# Patient Record
Sex: FEMALE | Race: WHITE | NOT HISPANIC OR LATINO | Employment: UNEMPLOYED | URBAN - NONMETROPOLITAN AREA
[De-identification: names, ages, dates, MRNs, and addresses within clinical notes are randomized per-mention and may not be internally consistent; named-entity substitution may affect disease eponyms.]

---

## 2017-03-24 PROCEDURE — 87086 URINE CULTURE/COLONY COUNT: CPT | Performed by: NURSE PRACTITIONER

## 2018-08-14 ENCOUNTER — OFFICE VISIT (OUTPATIENT)
Dept: OBSTETRICS AND GYNECOLOGY | Facility: CLINIC | Age: 23
End: 2018-08-14

## 2018-08-14 VITALS
HEART RATE: 104 BPM | SYSTOLIC BLOOD PRESSURE: 122 MMHG | BODY MASS INDEX: 29.49 KG/M2 | DIASTOLIC BLOOD PRESSURE: 86 MMHG | HEIGHT: 65 IN | WEIGHT: 177 LBS

## 2018-08-14 DIAGNOSIS — Z30.011 ENCOUNTER FOR INITIAL PRESCRIPTION OF CONTRACEPTIVE PILLS: ICD-10-CM

## 2018-08-14 DIAGNOSIS — Z01.419 ENCOUNTER FOR GYNECOLOGICAL EXAMINATION WITHOUT ABNORMAL FINDING: Primary | ICD-10-CM

## 2018-08-14 PROCEDURE — 99385 PREV VISIT NEW AGE 18-39: CPT | Performed by: NURSE PRACTITIONER

## 2018-08-14 PROCEDURE — G0123 SCREEN CERV/VAG THIN LAYER: HCPCS | Performed by: NURSE PRACTITIONER

## 2018-08-14 RX ORDER — NORETHINDRONE ACETATE AND ETHINYL ESTRADIOL AND FERROUS FUMARATE 1MG-20(24)
1 KIT ORAL DAILY
Qty: 28 TABLET | Refills: 3 | Status: SHIPPED | OUTPATIENT
Start: 2018-08-14 | End: 2019-08-14

## 2018-08-14 NOTE — PROGRESS NOTES
Subjective   Sandra Botello is a 23 y.o. Here for pap smear and would like to start on a birth control pill.     LMP- ; monthly for 5 days. Normal flow and very little cramping  Last pap- never  STI screening- never      Gynecologic Exam   The patient's pertinent negatives include no genital itching, genital lesions, genital odor, genital rash, missed menses, pelvic pain, vaginal bleeding or vaginal discharge. Pertinent negatives include no abdominal pain, constipation, diarrhea, dysuria, headaches, nausea, rash, urgency or vomiting. She is not sexually active. She uses abstinence for contraception. Her menstrual history has been regular. There is no history of an abdominal surgery, a  section, an ectopic pregnancy, endometriosis, a gynecological surgery, herpes simplex, menorrhagia, metrorrhagia, miscarriage, ovarian cysts, perineal abscess, PID, an STD, a terminated pregnancy or vaginosis.       The following portions of the patient's history were reviewed and updated as appropriate: allergies, current medications, past family history, past medical history, past social history, past surgical history and problem list.    Review of Systems   Constitutional: Negative for activity change, appetite change, fatigue and unexpected weight change.   Respiratory: Negative for chest tightness and shortness of breath.    Cardiovascular: Negative for chest pain, palpitations and leg swelling.   Gastrointestinal: Negative for abdominal distention, abdominal pain, blood in stool, constipation, diarrhea, nausea and vomiting.   Endocrine: Negative for cold intolerance, heat intolerance, polydipsia, polyphagia and polyuria.   Genitourinary: Negative for difficulty urinating, dysuria, genital sores, menorrhagia, menstrual problem, missed menses, pelvic pain, urgency, vaginal bleeding, vaginal discharge and vaginal pain.   Musculoskeletal: Negative for gait problem and myalgias.   Skin: Negative for color change,  pallor and rash.   Neurological: Negative for dizziness, weakness, light-headedness and headaches.   Hematological: Negative for adenopathy.   Psychiatric/Behavioral: Negative for agitation, confusion, dysphoric mood, self-injury and suicidal ideas. The patient is not nervous/anxious.        Objective   Physical Exam   Constitutional: She is oriented to person, place, and time. She appears well-developed and well-nourished.   Neck: No thyromegaly present.   Cardiovascular: Normal rate, regular rhythm, normal heart sounds and intact distal pulses.    Pulmonary/Chest: Effort normal and breath sounds normal. Right breast exhibits no inverted nipple, no mass, no nipple discharge, no skin change and no tenderness. Left breast exhibits no inverted nipple, no mass, no nipple discharge, no skin change and no tenderness. Breasts are symmetrical.   Abdominal: Soft. Bowel sounds are normal. She exhibits no distension. There is no tenderness.   Genitourinary: No breast discharge or bleeding. There is no rash, tenderness, lesion or injury on the right labia. There is no rash, tenderness, lesion or injury on the left labia. Right adnexum displays no mass, no tenderness and no fullness. Left adnexum displays no mass, no tenderness and no fullness. There is erythema in the vagina. No tenderness or bleeding in the vagina. No foreign body in the vagina. No signs of injury around the vagina. Vaginal discharge found.   Genitourinary Comments: Exam limited 2/2 pain. Blind sweep pap smear obtained.    Lymphadenopathy:     She has no axillary adenopathy.        Right: No inguinal adenopathy present.        Left: No inguinal adenopathy present.   Neurological: She is alert and oriented to person, place, and time.   Skin: Skin is warm, dry and intact.   Psychiatric: She has a normal mood and affect. Her speech is normal and behavior is normal.   Nursing note and vitals reviewed.        Assessment/Plan   Sandra was seen today for gynecologic  exam.    Diagnoses and all orders for this visit:    Encounter for gynecological examination without abnormal finding  -     Liquid-based Pap Smear, Screening    Encounter for initial prescription of contraceptive pills    Other orders  -     norethindrone-ethinyl estradiol-ferrous fumarate (LOESTIN 24 FE) 1-20 MG-MCG(24) per tablet; Take 1 tablet by mouth Daily.        R/B/A and administration reviewed. F/U in 3 months for BP recheck.

## 2018-08-16 LAB
LAB AP CASE REPORT: NORMAL
LAB AP GYN ADDITIONAL INFORMATION: NORMAL
LAB AP GYN OTHER FINDINGS: NORMAL
PATH INTERP SPEC-IMP: NORMAL
STAT OF ADQ CVX/VAG CYTO-IMP: NORMAL

## 2019-10-21 ENCOUNTER — OFFICE VISIT (OUTPATIENT)
Dept: OBSTETRICS AND GYNECOLOGY | Facility: CLINIC | Age: 24
End: 2019-10-21

## 2019-10-21 VITALS
BODY MASS INDEX: 29.82 KG/M2 | SYSTOLIC BLOOD PRESSURE: 102 MMHG | WEIGHT: 179 LBS | HEIGHT: 65 IN | DIASTOLIC BLOOD PRESSURE: 64 MMHG

## 2019-10-21 DIAGNOSIS — Z30.011 ENCOUNTER FOR INITIAL PRESCRIPTION OF CONTRACEPTIVE PILLS: Primary | ICD-10-CM

## 2019-10-21 PROCEDURE — 99213 OFFICE O/P EST LOW 20 MIN: CPT | Performed by: NURSE PRACTITIONER

## 2019-10-21 RX ORDER — NORETHINDRONE ACETATE AND ETHINYL ESTRADIOL AND FERROUS FUMARATE 1MG-20(24)
1 KIT ORAL DAILY
Qty: 28 TABLET | Refills: 12 | Status: SHIPPED | OUTPATIENT
Start: 2019-10-21 | End: 2020-10-06 | Stop reason: SDUPTHER

## 2019-10-21 NOTE — PROGRESS NOTES
Subjective   Sandra Botello is a 24 y.o. here for birth control     LMP 10/11/2019  BC: None  STI screening-Never.     Pt desires to start birth control pills; has not been sexually active in the past. Previously took Loestin for one month, but stopped taking it as she was not sexually active.       Contraception   Pertinent negatives include no abdominal pain, anorexia, chest pain, chills, coughing, fatigue, fever, headaches, nausea, rash, vomiting or weakness.       The following portions of the patient's history were reviewed and updated as appropriate: allergies, current medications, past family history, past medical history, past social history, past surgical history and problem list.    Review of Systems   Constitutional: Negative for chills, fatigue, fever, unexpected weight gain and unexpected weight loss.   Respiratory: Negative for cough and shortness of breath.    Cardiovascular: Negative for chest pain, palpitations and leg swelling.   Gastrointestinal: Negative for abdominal pain, anorexia, constipation, diarrhea, nausea and vomiting.   Endocrine: Negative for cold intolerance and heat intolerance.   Genitourinary: Negative for amenorrhea, difficulty urinating, dysuria, frequency, menstrual problem, urinary incontinence, vaginal bleeding, vaginal discharge and vaginal pain.   Skin: Negative for rash.   Neurological: Negative for weakness and headache.   Psychiatric/Behavioral: Negative for sleep disturbance, depressed mood and stress.       Objective   Physical Exam   Constitutional: She is oriented to person, place, and time. She appears well-developed and well-nourished.   HENT:   Head: Normocephalic.   Neck: Normal range of motion. Neck supple.   Cardiovascular: Normal rate and regular rhythm.   Pulmonary/Chest: Effort normal and breath sounds normal.   Abdominal: Soft.   Musculoskeletal: Normal range of motion.   Neurological: She is alert and oriented to person, place, and time.   Skin: Skin  is warm and dry.   Psychiatric: She has a normal mood and affect. Her behavior is normal.   Nursing note and vitals reviewed.        Assessment/Plan   Sandra was seen today for contraception.    Diagnoses and all orders for this visit:    Encounter for initial prescription of contraceptive pills    Other orders  -     norethindrone-ethinyl estradiol-ferrous fumarate (LOESTIN 24 FE) 1-20 MG-MCG(24) per tablet; Take 1 tablet by mouth Daily.      Pt desires OCPs; RBA discussed. Pt declined to discuss other methods. Reviewed mechanism of OCPs to prevent pregnancy. Reiterated OCPs do not protect from STIs; use condoms. Instructed pt can begin OCPs starting today; use back up method or abstain for 7 days before relying it for birth control. Return in 3 months for follow up and B/P check.

## 2020-03-10 ENCOUNTER — OFFICE VISIT (OUTPATIENT)
Dept: OBSTETRICS AND GYNECOLOGY | Facility: CLINIC | Age: 25
End: 2020-03-10

## 2020-03-10 VITALS
SYSTOLIC BLOOD PRESSURE: 106 MMHG | HEIGHT: 65 IN | BODY MASS INDEX: 29.49 KG/M2 | DIASTOLIC BLOOD PRESSURE: 64 MMHG | WEIGHT: 177 LBS

## 2020-03-10 DIAGNOSIS — Z30.41 ENCOUNTER FOR SURVEILLANCE OF CONTRACEPTIVE PILLS: Primary | ICD-10-CM

## 2020-03-10 PROCEDURE — 99212 OFFICE O/P EST SF 10 MIN: CPT | Performed by: NURSE PRACTITIONER

## 2020-03-10 NOTE — PROGRESS NOTES
Subjective   Sandra Botello is a 25 y.o. here for birth control follow-up  LMP: last week  BC: Loestrin  PAP:  August 2018      Pt reports she is happy with her birth control. She did have some breakthrough bleeding last month when she was late on taking her pills.  She is now sexually active and reports use of condoms. She believed that needed a PAP today but counseled pt that her PAP was normal in 2018 and she is not due till August of 2021. Blood pressure today is 106/64.     Contraception   This is a new problem. The current episode started more than 1 month ago. The problem occurs constantly. Pertinent negatives include no abdominal pain, fatigue, nausea, vomiting or weakness. Nothing aggravates the symptoms.       The following portions of the patient's history were reviewed and updated as appropriate: allergies, current medications, past family history, past medical history, past social history, past surgical history and problem list.    Review of Systems   Constitutional: Negative for fatigue.   Gastrointestinal: Negative for abdominal pain, nausea and vomiting.   Neurological: Negative for weakness.       Objective   Physical Exam   Constitutional: She is oriented to person, place, and time. She appears well-developed and well-nourished.   HENT:   Head: Normocephalic.   Neck: Normal range of motion.   Pulmonary/Chest: Effort normal.   Abdominal: Soft.   Neurological: She is alert and oriented to person, place, and time.   Skin: Skin is warm and dry.   Psychiatric: She has a normal mood and affect. Her behavior is normal.   Nursing note and vitals reviewed.        Assessment/Plan   Sandra was seen today for follow-up.    Diagnoses and all orders for this visit:    Encounter for surveillance of contraceptive pills        Reviewed cytology guidelines. Return in October for BC refills and Pap is due August 2021.

## 2020-09-30 PROBLEM — N39.0 URINARY TRACT INFECTION IN FEMALE: Status: ACTIVE | Noted: 2020-09-30

## 2020-10-06 ENCOUNTER — OFFICE VISIT (OUTPATIENT)
Dept: OBSTETRICS AND GYNECOLOGY | Facility: CLINIC | Age: 25
End: 2020-10-06

## 2020-10-06 ENCOUNTER — LAB (OUTPATIENT)
Dept: LAB | Facility: HOSPITAL | Age: 25
End: 2020-10-06

## 2020-10-06 VITALS
SYSTOLIC BLOOD PRESSURE: 102 MMHG | DIASTOLIC BLOOD PRESSURE: 64 MMHG | WEIGHT: 166 LBS | BODY MASS INDEX: 27.66 KG/M2 | HEIGHT: 65 IN

## 2020-10-06 DIAGNOSIS — N39.0 URINARY TRACT INFECTION IN FEMALE: Primary | ICD-10-CM

## 2020-10-06 DIAGNOSIS — Z11.3 SCREENING FOR STDS (SEXUALLY TRANSMITTED DISEASES): Primary | ICD-10-CM

## 2020-10-06 PROCEDURE — 87591 N.GONORRHOEAE DNA AMP PROB: CPT | Performed by: NURSE PRACTITIONER

## 2020-10-06 PROCEDURE — 99213 OFFICE O/P EST LOW 20 MIN: CPT | Performed by: NURSE PRACTITIONER

## 2020-10-06 PROCEDURE — 87661 TRICHOMONAS VAGINALIS AMPLIF: CPT | Performed by: NURSE PRACTITIONER

## 2020-10-06 PROCEDURE — 87491 CHLMYD TRACH DNA AMP PROBE: CPT | Performed by: NURSE PRACTITIONER

## 2020-10-06 RX ORDER — NORETHINDRONE ACETATE AND ETHINYL ESTRADIOL AND FERROUS FUMARATE 1MG-20(24)
1 KIT ORAL DAILY
Qty: 84 TABLET | Refills: 3 | Status: SHIPPED | OUTPATIENT
Start: 2020-10-06 | End: 2021-02-12

## 2020-10-06 NOTE — PROGRESS NOTES
Subjective   Sandra Botello is a 25 y.o. here for follow-up on birth control pills    Sandra is happy with her birth control pills. Has never been screening for G/C. Uses condoms and has one sexual partner. Pt performs self breast exams and has no other gyn concerns.       The following portions of the patient's history were reviewed and updated as appropriate: allergies, current medications, past family history, past medical history, past social history, past surgical history and problem list.    Review of Systems   Constitutional: Negative for chills, fatigue and fever.   Respiratory: Negative for shortness of breath.    Cardiovascular: Negative for chest pain and palpitations.   Gastrointestinal: Negative for abdominal pain, constipation, diarrhea and nausea.   Genitourinary: Negative for dysuria, frequency, menstrual problem, pelvic pressure, vaginal bleeding, vaginal discharge and vaginal pain.   Skin: Negative for rash.   Neurological: Negative for headache.   Psychiatric/Behavioral: Negative for depressed mood.       Objective   Physical Exam  Constitutional:       Appearance: Normal appearance.   HENT:      Head: Normocephalic.   Pulmonary:      Effort: Pulmonary effort is normal.   Neurological:      Mental Status: She is alert.   Psychiatric:         Mood and Affect: Mood normal.         Behavior: Behavior normal.           Assessment/Plan   Diagnoses and all orders for this visit:    Screening for STDs (sexually transmitted diseases)  -     CT/NG, TV, PCR - Urine, Urine, Random Void  -     Chlamydia trachomatis, Neisseria gonorrhoeae, PCR - Urine, Cervix  -     Trichomonas vaginalis, PCR - Urine, Urine, Random Void    Other orders  -     norethindrone-ethinyl estradiol-ferrous fumarate (LOESTIN 24 FE) 1-20 MG-MCG(24) per tablet; Take 1 tablet by mouth Daily.        Loestrin refilled. Will do G/C today. Discussed breast exam and pelvic exam; pt with no complaints and desires deferring exam. Reviewed  cytology guidelines and pt is due for next Pap in August 2021.

## 2020-10-07 LAB — SPECIMEN STATUS: NORMAL

## 2020-10-09 LAB
C TRACH RRNA SPEC QL NAA+PROBE: NEGATIVE
N GONORRHOEA RRNA SPEC QL NAA+PROBE: NEGATIVE
T VAGINALIS DNA SPEC QL NAA+PROBE: NEGATIVE

## 2020-11-17 ENCOUNTER — TELEPHONE (OUTPATIENT)
Dept: OBSTETRICS AND GYNECOLOGY | Facility: CLINIC | Age: 25
End: 2020-11-17

## 2020-11-17 NOTE — TELEPHONE ENCOUNTER
Within the last month has had spotting on her birth control that she has been on for a year. She says she takes it at the same time every day. Was not sure if it needed to be changed for a different birth control or if she should give it time.

## 2020-11-18 ENCOUNTER — TELEPHONE (OUTPATIENT)
Dept: OBSTETRICS AND GYNECOLOGY | Facility: CLINIC | Age: 25
End: 2020-11-18

## 2020-11-18 NOTE — TELEPHONE ENCOUNTER
Returned pt phone call. She reports light vaginal spotting while wiping right before menses starts for the past two months.  She has also lost some purposeful weight recently.  Educated pt on side effect of BTB on arina especially with weight changes.  If symptoms worsen or fail to improve follow up.

## 2021-02-12 ENCOUNTER — TELEPHONE (OUTPATIENT)
Dept: OBSTETRICS AND GYNECOLOGY | Facility: CLINIC | Age: 26
End: 2021-02-12

## 2021-02-12 RX ORDER — LEVONORGESTREL AND ETHINYL ESTRADIOL 0.15-0.03
1 KIT ORAL DAILY
Qty: 28 TABLET | Refills: 12 | Status: SHIPPED | OUTPATIENT
Start: 2021-02-12 | End: 2021-11-01 | Stop reason: HOSPADM

## 2021-03-18 ENCOUNTER — CLINICAL SUPPORT (OUTPATIENT)
Dept: OBSTETRICS AND GYNECOLOGY | Facility: CLINIC | Age: 26
End: 2021-03-18

## 2021-03-18 DIAGNOSIS — Z23 NEED FOR HPV VACCINE: Primary | ICD-10-CM

## 2021-03-18 PROCEDURE — 90471 IMMUNIZATION ADMIN: CPT | Performed by: NURSE PRACTITIONER

## 2021-03-18 PROCEDURE — 90651 9VHPV VACCINE 2/3 DOSE IM: CPT | Performed by: NURSE PRACTITIONER

## 2021-04-13 LAB — HOLD SPECIMEN: NORMAL

## 2021-05-14 ENCOUNTER — CLINICAL SUPPORT (OUTPATIENT)
Dept: OBSTETRICS AND GYNECOLOGY | Facility: CLINIC | Age: 26
End: 2021-05-14

## 2021-05-14 DIAGNOSIS — Z23 NEED FOR HPV VACCINE: Primary | ICD-10-CM

## 2021-05-14 PROCEDURE — 90471 IMMUNIZATION ADMIN: CPT | Performed by: NURSE PRACTITIONER

## 2021-05-14 PROCEDURE — 90651 9VHPV VACCINE 2/3 DOSE IM: CPT | Performed by: NURSE PRACTITIONER

## 2021-07-09 ENCOUNTER — TELEPHONE (OUTPATIENT)
Dept: OBSTETRICS AND GYNECOLOGY | Facility: CLINIC | Age: 26
End: 2021-07-09

## 2021-07-09 ENCOUNTER — LAB (OUTPATIENT)
Dept: LAB | Facility: HOSPITAL | Age: 26
End: 2021-07-09

## 2021-07-09 DIAGNOSIS — N89.8 VAGINAL DISCHARGE: ICD-10-CM

## 2021-07-09 DIAGNOSIS — N89.8 VAGINAL ITCHING: Primary | ICD-10-CM

## 2021-07-09 DIAGNOSIS — N89.8 VAGINAL ITCHING: ICD-10-CM

## 2021-07-09 LAB
CANDIDA ALBICANS: NEGATIVE
GARDNERELLA VAGINALIS: POSITIVE
T VAGINALIS DNA VAG QL PROBE+SIG AMP: NEGATIVE

## 2021-07-09 PROCEDURE — 87480 CANDIDA DNA DIR PROBE: CPT

## 2021-07-09 PROCEDURE — 87510 GARDNER VAG DNA DIR PROBE: CPT

## 2021-07-09 PROCEDURE — 87660 TRICHOMONAS VAGIN DIR PROBE: CPT

## 2021-07-09 PROCEDURE — 87255 GENET VIRUS ISOLATE HSV: CPT

## 2021-07-09 RX ORDER — METRONIDAZOLE 500 MG/1
500 TABLET ORAL 2 TIMES DAILY
Qty: 14 TABLET | Refills: 0 | Status: SHIPPED | OUTPATIENT
Start: 2021-07-09 | End: 2021-07-16

## 2021-07-09 NOTE — TELEPHONE ENCOUNTER
Patient called having discharge and complains of having red bumps orders placed for patient to come to the lab after speaking to Leslie Cooks nurse

## 2021-07-12 LAB — HSV SPEC CULT: POSITIVE

## 2021-07-13 DIAGNOSIS — A60.04 HERPES, VULVAR: Primary | ICD-10-CM

## 2021-09-10 ENCOUNTER — OFFICE VISIT (OUTPATIENT)
Dept: OBSTETRICS AND GYNECOLOGY | Facility: CLINIC | Age: 26
End: 2021-09-10

## 2021-09-10 VITALS
BODY MASS INDEX: 27.99 KG/M2 | HEIGHT: 65 IN | WEIGHT: 168 LBS | SYSTOLIC BLOOD PRESSURE: 110 MMHG | DIASTOLIC BLOOD PRESSURE: 74 MMHG

## 2021-09-10 DIAGNOSIS — Z01.419 WOMEN'S ANNUAL ROUTINE GYNECOLOGICAL EXAMINATION: Primary | ICD-10-CM

## 2021-09-10 DIAGNOSIS — Z23 NEED FOR HPV VACCINATION: ICD-10-CM

## 2021-09-10 PROCEDURE — 90471 IMMUNIZATION ADMIN: CPT | Performed by: NURSE PRACTITIONER

## 2021-09-10 PROCEDURE — 90651 9VHPV VACCINE 2/3 DOSE IM: CPT | Performed by: NURSE PRACTITIONER

## 2021-09-10 PROCEDURE — 99395 PREV VISIT EST AGE 18-39: CPT | Performed by: NURSE PRACTITIONER

## 2021-09-10 NOTE — PROGRESS NOTES
Aury Botello is a 26 y.o. Annual gynecological exam    LMP: 08/19/2021  Pap: NIL, 2018  BC: Kyra    Pt presents for annual gynecological exam with no complaints.        Gynecologic Exam  The patient's pertinent negatives include no genital itching, genital lesions, genital odor, genital rash, missed menses, pelvic pain, vaginal bleeding or vaginal discharge. The patient is experiencing no pain. She is not pregnant. Pertinent negatives include no abdominal pain, chills, constipation, diarrhea, dysuria, fever, flank pain, frequency, headaches, hematuria, rash or urgency. She is sexually active. No, her partner does not have an STD. She uses oral contraceptives for contraception. Her menstrual history has been regular.       The following portions of the patient's history were reviewed and updated as appropriate: allergies, current medications, past family history, past medical history, past social history, past surgical history and problem list.    Review of Systems   Constitutional: Negative for chills, diaphoresis, fatigue, fever and unexpected weight change.   Respiratory: Negative for apnea, chest tightness and shortness of breath.    Cardiovascular: Negative for chest pain, palpitations and leg swelling.   Gastrointestinal: Negative for abdominal distention, abdominal pain, constipation and diarrhea.   Genitourinary: Negative for decreased urine volume, difficulty urinating, dyspareunia, dysuria, enuresis, flank pain, frequency, genital sores, hematuria, menstrual problem, missed menses, pelvic pain, urgency, vaginal bleeding, vaginal discharge and vaginal pain.   Skin: Negative for rash.   Neurological: Negative for headaches.   Psychiatric/Behavioral: Negative for sleep disturbance and suicidal ideas.         Objective   Physical Exam  Vitals and nursing note reviewed. Exam conducted with a chaperone present.   Constitutional:       General: She is awake. She is not in acute distress.      Appearance: Normal appearance. She is well-developed and well-groomed. She is not ill-appearing, toxic-appearing or diaphoretic.   Neck:      Thyroid: No thyroid mass, thyromegaly or thyroid tenderness.   Cardiovascular:      Rate and Rhythm: Normal rate and regular rhythm.      Heart sounds: Normal heart sounds.   Pulmonary:      Effort: Pulmonary effort is normal.      Breath sounds: Normal breath sounds.   Chest:      Breasts: Teto Score is 5. Breasts are symmetrical.         Right: Normal. No swelling, bleeding, inverted nipple, mass, nipple discharge, skin change or tenderness.         Left: Normal. No swelling, bleeding, inverted nipple, mass, nipple discharge, skin change or tenderness.   Abdominal:      General: Bowel sounds are normal. There is no distension.      Palpations: Abdomen is soft.      Tenderness: There is no abdominal tenderness.   Genitourinary:     General: Normal vulva.      Exam position: Lithotomy position.      Teto stage (genital): 5.      Labia:         Right: No rash, tenderness, lesion or injury.         Left: No rash, tenderness, lesion or injury.       Urethra: No prolapse, urethral pain, urethral swelling or urethral lesion.      Vagina: Normal.      Cervix: Normal.      Uterus: Normal.       Adnexa: Right adnexa normal and left adnexa normal.        Right: No mass, tenderness or fullness.          Left: No mass, tenderness or fullness.        Comments: Pap smear obtained  Lymphadenopathy:      Upper Body:      Right upper body: No supraclavicular, axillary or pectoral adenopathy.      Left upper body: No supraclavicular, axillary or pectoral adenopathy.      Lower Body: No right inguinal adenopathy. No left inguinal adenopathy.   Skin:     General: Skin is warm and dry.   Neurological:      Mental Status: She is alert and oriented to person, place, and time.      Gait: Gait is intact.   Psychiatric:         Attention and Perception: Attention and perception normal.          Mood and Affect: Mood and affect normal.         Speech: Speech normal.         Behavior: Behavior normal. Behavior is cooperative.           Assessment/Plan   Diagnoses and all orders for this visit:    1. Women's annual routine gynecological examination (Primary)    2. Need for HPV vaccination      Patient educated and encouraged to do monthly self breast exam. If pap smear is normal patient will receive a letter in the mail in about two weeks.  If pap smear is abnormal we will call patient and follow up with plan.  Next pap due 3 years if normal today per ASCCP guidelines.  Last HPV injection given today.  RTC in 1 year for annual gynecological exam or sooner if needed.

## 2021-09-15 LAB
LAB AP CASE REPORT: NORMAL
PATH INTERP SPEC-IMP: NORMAL

## 2021-09-27 ENCOUNTER — LAB (OUTPATIENT)
Dept: LAB | Facility: HOSPITAL | Age: 26
End: 2021-09-27

## 2021-09-27 DIAGNOSIS — O26.859 SPOTTING IN EARLY PREGNANCY: Primary | ICD-10-CM

## 2021-09-27 DIAGNOSIS — O20.9 VAGINAL BLEEDING AFFECTING EARLY PREGNANCY: Primary | ICD-10-CM

## 2021-09-27 LAB — HCG INTACT+B SERPL-ACNC: 229 MIU/ML

## 2021-09-27 PROCEDURE — 84702 CHORIONIC GONADOTROPIN TEST: CPT | Performed by: NURSE PRACTITIONER

## 2021-09-27 PROCEDURE — 36415 COLL VENOUS BLD VENIPUNCTURE: CPT | Performed by: NURSE PRACTITIONER

## 2021-10-04 ENCOUNTER — LAB (OUTPATIENT)
Dept: LAB | Facility: HOSPITAL | Age: 26
End: 2021-10-04

## 2021-10-04 LAB — HCG INTACT+B SERPL-ACNC: 4.81 MIU/ML

## 2021-10-04 PROCEDURE — 36415 COLL VENOUS BLD VENIPUNCTURE: CPT | Performed by: NURSE PRACTITIONER

## 2021-10-04 PROCEDURE — 84702 CHORIONIC GONADOTROPIN TEST: CPT | Performed by: NURSE PRACTITIONER

## 2021-10-13 ENCOUNTER — LAB (OUTPATIENT)
Dept: LAB | Facility: HOSPITAL | Age: 26
End: 2021-10-13

## 2021-10-13 DIAGNOSIS — R30.0 DYSURIA: Primary | ICD-10-CM

## 2021-10-13 DIAGNOSIS — R30.0 DYSURIA: ICD-10-CM

## 2021-10-13 PROCEDURE — 81003 URINALYSIS AUTO W/O SCOPE: CPT

## 2021-10-13 PROCEDURE — 87086 URINE CULTURE/COLONY COUNT: CPT

## 2021-10-14 LAB
BACTERIA SPEC AEROBE CULT: NORMAL
BILIRUB UR QL STRIP: NEGATIVE
CLARITY UR: CLEAR
COLOR UR: YELLOW
GLUCOSE UR STRIP-MCNC: NEGATIVE MG/DL
HGB UR QL STRIP.AUTO: NEGATIVE
KETONES UR QL STRIP: NEGATIVE
LEUKOCYTE ESTERASE UR QL STRIP.AUTO: NEGATIVE
NITRITE UR QL STRIP: NEGATIVE
PH UR STRIP.AUTO: 5.5 [PH] (ref 5–8)
PROT UR QL STRIP: NEGATIVE
SP GR UR STRIP: 1.02 (ref 1–1.03)
UROBILINOGEN UR QL STRIP: NORMAL

## 2021-10-19 ENCOUNTER — TELEPHONE (OUTPATIENT)
Dept: OBSTETRICS AND GYNECOLOGY | Facility: CLINIC | Age: 26
End: 2021-10-19

## 2021-10-19 ENCOUNTER — LAB (OUTPATIENT)
Dept: LAB | Facility: HOSPITAL | Age: 26
End: 2021-10-19

## 2021-10-19 DIAGNOSIS — N89.8 VAGINAL DISCHARGE: Primary | ICD-10-CM

## 2021-10-19 DIAGNOSIS — N89.8 VAGINAL DISCHARGE: ICD-10-CM

## 2021-10-19 LAB
CANDIDA ALBICANS: NEGATIVE
GARDNERELLA VAGINALIS: NEGATIVE
T VAGINALIS DNA VAG QL PROBE+SIG AMP: NEGATIVE

## 2021-10-19 PROCEDURE — 87510 GARDNER VAG DNA DIR PROBE: CPT

## 2021-10-19 PROCEDURE — 87660 TRICHOMONAS VAGIN DIR PROBE: CPT

## 2021-10-19 PROCEDURE — 87480 CANDIDA DNA DIR PROBE: CPT

## 2021-10-19 NOTE — TELEPHONE ENCOUNTER
Patient says she is still experiencing the same symptoms that she did last week when she did labs   So patient is wanting to vaginal swab

## 2021-10-21 ENCOUNTER — TELEPHONE (OUTPATIENT)
Dept: OBSTETRICS AND GYNECOLOGY | Facility: CLINIC | Age: 26
End: 2021-10-21

## 2021-10-21 NOTE — TELEPHONE ENCOUNTER
Patient called and is needing to speak to Nancy due to her diagnosis. She has a question regarding the diagnosis. Her number to call back is [phone].        Thank you,      Brianne

## 2021-11-01 ENCOUNTER — OFFICE VISIT (OUTPATIENT)
Dept: OBSTETRICS AND GYNECOLOGY | Facility: CLINIC | Age: 26
End: 2021-11-01

## 2021-11-01 VITALS
HEIGHT: 65 IN | DIASTOLIC BLOOD PRESSURE: 62 MMHG | WEIGHT: 171.8 LBS | BODY MASS INDEX: 28.62 KG/M2 | SYSTOLIC BLOOD PRESSURE: 112 MMHG

## 2021-11-01 DIAGNOSIS — N94.9 VAGINAL BURNING: Primary | ICD-10-CM

## 2021-11-01 DIAGNOSIS — B00.9 HSV (HERPES SIMPLEX VIRUS) INFECTION: ICD-10-CM

## 2021-11-01 PROCEDURE — 87660 TRICHOMONAS VAGIN DIR PROBE: CPT | Performed by: STUDENT IN AN ORGANIZED HEALTH CARE EDUCATION/TRAINING PROGRAM

## 2021-11-01 PROCEDURE — 87510 GARDNER VAG DNA DIR PROBE: CPT | Performed by: STUDENT IN AN ORGANIZED HEALTH CARE EDUCATION/TRAINING PROGRAM

## 2021-11-01 PROCEDURE — 87480 CANDIDA DNA DIR PROBE: CPT | Performed by: STUDENT IN AN ORGANIZED HEALTH CARE EDUCATION/TRAINING PROGRAM

## 2021-11-01 PROCEDURE — 99213 OFFICE O/P EST LOW 20 MIN: CPT | Performed by: STUDENT IN AN ORGANIZED HEALTH CARE EDUCATION/TRAINING PROGRAM

## 2021-11-01 RX ORDER — VALACYCLOVIR HYDROCHLORIDE 1 G/1
TABLET, FILM COATED ORAL
COMMUNITY
Start: 2021-10-25 | End: 2022-01-17

## 2021-11-01 NOTE — PROGRESS NOTES
[facility]  Gynecology  Date of Service: 2021    CC: burning in vagina    HPI  Sandra Botello is a 26 y.o.  premenopausal female who presents with complaints of burning in vaginal introitus.      Last seen by Beth Rodriguez for AC 9/10/21. Since then she has had sab with hcg 229 on 21, 4.81 on 10/4. On Nordette for oral contraceptive. 2018 NIL pap. Completed HPV vaccine series 9/10/21.    2 weeks ago started having burning pain right inside vaginal introitus. H/o HSV and discussed with Beth over phone and recommended Valtrex treatment course and gave patient RX for lidocaine gel she could place on lesion. Patient did not use lidocaine as said external use only and her lesion was right inside introitus. 10/19/21: Vanessa, Gardnerella, Trich neg. 10/13/21 urine cx negative  9/10/21: NIL pap, transformation zone absent. Used Monistat OTC over weekend and thinks maybe helped. Overall burning is significantly improved, only hurts minorly if sitting in certain positions, since starting menses on 10/29. Bleeding now light. Denies concern for STI and does not desire GC/CT screen today.    Denies any vaginal itching, irritation, or discharge. Denies any abnormal uterine bleeding. Denies any sexual dysfunction concerns. Denies any urinary symptoms including incontinence, dysuria, frequency, urgency, nocturia.    ROS  Review of Systems   Constitutional: Negative.    HENT: Negative.    Respiratory: Negative.    Cardiovascular: Negative.    Gastrointestinal: Negative.    Genitourinary: Positive for vaginal pain. Negative for dyspareunia, dysuria, menstrual problem and vaginal discharge.   Psychiatric/Behavioral: Negative.        GYN HISTORY  History of STIs: HSV  Last pap smear:   Last Completed Pap Smear          PAP SMEAR (Every 3 Years) Next due on 9/10/2024    09/10/2021  Liquid-based Pap Smear, Screening    2018  Liquid-based Pap Smear, Screening           "    Contraception: see above     OB HISTORY  OB History    Para Term  AB Living   0 0 0 0 0 0   SAB IAB Ectopic Molar Multiple Live Births   0 0 0 0 0 0     PAST MEDICAL HISTORY  No past medical history on file.  PAST SURGICAL HISTORY  No past surgical history on file.  FAMILY HISTORY  Family History   Problem Relation Age of Onset   • No Known Problems Father    • No Known Problems Mother    • No Known Problems Brother    • No Known Problems Sister      SOCIAL HISTORY  Social History     Socioeconomic History   • Marital status: Single   Tobacco Use   • Smoking status: Never Smoker   • Smokeless tobacco: Never Used   Substance and Sexual Activity   • Alcohol use: No   • Drug use: No   • Sexual activity: Yes     Partners: Male     Birth control/protection: None     ALLERGIES  No Known Allergies  HOME MEDICATIONS  Prior to Admission medications    Medication Sig Start Date End Date Taking? Authorizing Provider   valACYclovir (VALTREX) 1000 MG tablet  10/25/21  Yes Provider, MD Chris   Lidocaine 4 % solution Apply 1 application topically 2 (Two) Times a Day As Needed (pain). 10/26/21   Elizabeth Robbins APRN   levonorgestrel-ethinyl estradiol (NORDETTE) 0.15-30 MG-MCG per tablet Take 1 tablet by mouth Daily. 21  Yady Wilson APRN     PE  /62   Ht 165.1 cm (65\")   Wt 77.9 kg (171 lb 12.8 oz)   BMI 28.59 kg/m²        General: Alert, healthy, no distress, well nourished and well developed.  Neurologic: Alert, oriented to person, place, and time.  Gait normal.  Cranial nerves II-XII grossly intact.  HEENT: Normocephalic, atraumatic.  Extraocular muscles intact.  Lungs: Normal respiratory effort.    Skin: No rash, no lesions.  Extremities: No cyanosis, clubbing or edema.  PELVIC EXAM:  External Genitalia/Vulva: Anatomy is normal, no significant redness of labia, no discharge on vulvar tissues, Trevorton's and Bartholin's glands are normal, no ulcers, no condylomatous " lesions.  Urethral meatus: Normal, no lesions, no prolapse.    IMPRESSION  Sandra Botello is a 26 y.o.  presenting with with recent episode of vaginal burning pain at introitus.    PLAN    1. Vaginal burning, h/o HSV   - One prior vulvovaginal outbreak and one prior HSV lesion on buttocks; placed on suppression by dermatologist  - Gardnerella vaginalis, Trichomonas vaginalis, Candida albicans, DNA - Swab, Vagina; Future  - H/o HSV, treated with Valtrex and noting improvement in symptoms, now almost resolved  - Recommended folllowup if repeat symptoms               This document has been electronically signed by Edith Olivo DO on 2021 23:11 CDT

## 2021-11-02 ENCOUNTER — TELEPHONE (OUTPATIENT)
Dept: OBSTETRICS AND GYNECOLOGY | Facility: CLINIC | Age: 26
End: 2021-11-02

## 2021-11-02 RX ORDER — METRONIDAZOLE 500 MG/1
500 TABLET ORAL 2 TIMES DAILY
Qty: 14 TABLET | Refills: 0 | Status: SHIPPED | OUTPATIENT
Start: 2021-11-02 | End: 2021-11-09

## 2021-11-02 NOTE — TELEPHONE ENCOUNTER
Miss Botello, called in today requesting the results on her lab test yesterday. Her lab did come back showing she had bacteria vaginosis. Which is an over growth of our normal bacteria. A prescription for Flagyl was sent into the pharmacy for her to  at her convenience. Patient voiced a understanding to this treatment plan and was advised to call us with any questions or concerns.

## 2021-11-09 ENCOUNTER — TELEPHONE (OUTPATIENT)
Dept: OBSTETRICS AND GYNECOLOGY | Facility: CLINIC | Age: 26
End: 2021-11-09

## 2021-11-09 RX ORDER — METRONIDAZOLE 65 MG/5G
1 GEL TOPICAL ONCE
Qty: 1 EACH | Refills: 1 | Status: SHIPPED | OUTPATIENT
Start: 2021-11-09 | End: 2021-11-09

## 2021-11-09 NOTE — TELEPHONE ENCOUNTER
Called and discussed with patient continued vaginal bleeding. Finished taking Flagyl yesterday. Feels it did help. Burning is more with urination, right inside introitus. Denies dysuria. No HSV lesions noted, still taking daily suppression. No vaginal discharge. Had vaginal intercourse over weekend without significant worsening symptoms. Discussed antibiotic just finished yesterday and may continue to work for a couple of days after completing treatment. Since she is feeling better, wait two more days and if symptoms persist or if symptoms getting worse, try Nuvessa (Metronidazole gel). If getting worse or not improving, to call office. Would do One Swab and evaluate for HSV lesions (none on last visit). Patient amenable to plan of care.

## 2021-11-24 ENCOUNTER — OFFICE VISIT (OUTPATIENT)
Dept: OBSTETRICS AND GYNECOLOGY | Facility: CLINIC | Age: 26
End: 2021-11-24

## 2021-11-24 VITALS
WEIGHT: 175.8 LBS | BODY MASS INDEX: 29.29 KG/M2 | HEIGHT: 65 IN | DIASTOLIC BLOOD PRESSURE: 66 MMHG | SYSTOLIC BLOOD PRESSURE: 116 MMHG

## 2021-11-24 DIAGNOSIS — N89.8 VAGINAL DISCHARGE: Primary | ICD-10-CM

## 2021-11-24 DIAGNOSIS — N90.89 VULVAR IRRITATION: ICD-10-CM

## 2021-11-24 PROCEDURE — 99213 OFFICE O/P EST LOW 20 MIN: CPT | Performed by: STUDENT IN AN ORGANIZED HEALTH CARE EDUCATION/TRAINING PROGRAM

## 2021-11-24 NOTE — PROGRESS NOTES
[facility]  Gynecology  Date of Service: 2021    CC: intermittent vaginal itching/burning    HPI  Sandra Botello is a 26 y.o.  premenopausal female who presents with complaints of intermittent vaginal itching/burning.      Seen previously on 2 occasions since sab a couple of months ago. Initially thought to be possible HSV outbreak and on Valtrex with minimal relief. Then treated for BV with minimal relief with oral Flagyl, moderate improvement with Vaginal Metronidazole but has had intermittent vulvar burning with urination since. No significant discharge, some scant brown discharge x 2 days but thinks will be starting period soon. Patient is nervous as she and  are trying again for pregnancy and she is worried something could be wrong. Denies dysuria. Denies abdominal cramping or pain. LMP 21. Says emotionally has been doing okay since miscarriage.     ROS  Review of Systems   Constitutional: Negative.    HENT: Negative.    Eyes: Negative.    Respiratory: Negative.    Cardiovascular: Negative.    Gastrointestinal: Negative.    Endocrine: Negative.    Genitourinary: Positive for vaginal discharge.   Musculoskeletal: Negative.    Skin: Negative.    Psychiatric/Behavioral: Negative.        GYN HISTORY  History of STIs: HSV  Last pap smear:   Last Completed Pap Smear          PAP SMEAR (Every 3 Years) Next due on 9/10/2024    09/10/2021  Liquid-based Pap Smear, Screening    2018  Liquid-based Pap Smear, Screening                 OB HISTORY  OB History    Para Term  AB Living   0 0 0 0 0 0   SAB IAB Ectopic Molar Multiple Live Births   0 0 0 0 0 0     PAST MEDICAL HISTORY  No past medical history on file.  PAST SURGICAL HISTORY  No past surgical history on file.  FAMILY HISTORY  Family History   Problem Relation Age of Onset   • No Known Problems Father    • No Known Problems Mother    • No Known Problems Brother    • No Known Problems Sister   "    SOCIAL HISTORY  Social History     Socioeconomic History   • Marital status: Single   Tobacco Use   • Smoking status: Never Smoker   • Smokeless tobacco: Never Used   Substance and Sexual Activity   • Alcohol use: No   • Drug use: No   • Sexual activity: Yes     Partners: Male     Birth control/protection: None     ALLERGIES  No Known Allergies  HOME MEDICATIONS  Prior to Admission medications    Medication Sig Start Date End Date Taking? Authorizing Provider   valACYclovir (VALTREX) 1000 MG tablet  10/25/21  Yes Provider, MD Chris   Lidocaine 4 % solution Apply 1 application topically 2 (Two) Times a Day As Needed (pain). 10/26/21   Jessa Robbinse W, APRN     PE  /66   Ht 165.1 cm (65\")   Wt 79.7 kg (175 lb 12.8 oz)   LMP 11/05/2021   BMI 29.25 kg/m²        General: Alert, healthy, no distress, well nourished and well developed.  Neurologic: Alert, oriented to person, place, and time.  Gait normal.  Cranial nerves II-XII grossly intact.  HEENT: Normocephalic, atraumatic.  Extraocular muscles intact.  Lungs: Normal respiratory effort.    Skin: No rash, no lesions.  Extremities: No cyanosis, clubbing or edema.  PELVIC EXAM:  External Genitalia/Vulva: Anatomy is normal, no significant redness of labia, no discharge on vulvar tissues, Closter's and Bartholin's glands are normal, no ulcers, no condylomatous lesions. No ulcerative lesions.  Urethral meatus: Normal, no lesions, no prolapse.  Urethra: Normal, no masses, no tenderness with palpation.  Bladder: Normal, no fullness, no masses, no tenderness with palpation.  Vagina: Vaginal tissues are not inflamed, normal color and texture, no significant discharge present.  Pelvic support adequate.  Cervix: Normal, no lesions, no purulent discharge, Scant brown discharge in vagina and at external os consistent with old dark blood,  no cervical motion tenderness.  Uterus: Normal size, shape, and consistency.  Good mobility noted.  Minimal descent noted with " good support.  Adnexa: Normal size and shape bilaterally, no palpable mass bilaterally and non-tender bilaterally.  Rectal:  JASMYN deferred.    IMPRESSION  Sandra Botello is a 26 y.o.  presenting with vulvar burning with urination, scant brown discharge.    PLAN    1. Vaginal discharge, Vulvar irritation  - Provided reassurance that normal exam as above  - Patient anxious as desiring pregnancy and they are trying, worried about h/o BV and possible recurrent infection after miscarriage a couple of months ago  - OneSwab - Kit, Vagina; Future  - Continue PNV  - Continue Valtrex as per Dermatologist                 This document has been electronically signed by Edith Olivo DO on 2021 18:49 CST

## 2021-12-06 ENCOUNTER — TELEPHONE (OUTPATIENT)
Dept: OBSTETRICS AND GYNECOLOGY | Facility: CLINIC | Age: 26
End: 2021-12-06

## 2021-12-06 NOTE — TELEPHONE ENCOUNTER
Called and discussed recent results with patient. She had OneSwab performed with all tested bacteria/fungus negative. Discussed this is reassuring that there are no signs of infection. Patient has been anxious that she has been having vulvar itching/burning that she notices after menstrual cycles that resolves with her menses. Although the symptom isn't overly bothersome, she was worried as it has been going on since her miscarriage and she thought maybe something was wrong. No AUB. No foul smelling discharge. Regular menses. Discussed most likely hormonal or dermatitis. Would recommend trying vaginal moisturizer such as Replens or coconut oil for relief of symptoms. Would recommend scent free detergents, sensitive skin soap or no soap when washing vagina. Has been avoiding tampons/pads, not douching. Patient to try vaginal moisturizers then let us know if symptoms worsening. Overall tried to reassure patient as she was nervous that she could have a complication from miscarriage.        This document has been electronically signed by Edith Olivo DO on December 6, 2021 12:26 CST

## 2021-12-14 ENCOUNTER — TELEPHONE (OUTPATIENT)
Dept: OBSTETRICS AND GYNECOLOGY | Facility: CLINIC | Age: 26
End: 2021-12-14

## 2021-12-15 ENCOUNTER — TELEPHONE (OUTPATIENT)
Dept: OBSTETRICS AND GYNECOLOGY | Facility: CLINIC | Age: 26
End: 2021-12-15

## 2021-12-15 NOTE — TELEPHONE ENCOUNTER
Called and spoke to magalys. Great relief with Replens. Some itching when starts to wear off. Using daily. Recommend trialing all scent free soaps/shampoos (using scent free detergents at this time). Has used probiotics which didn't help. Negative swabs previously including OneSwab. Normal exam when seen previously on multiple occasions. Regular menses do not suggest hormonal imbalance. With improving symptoms recommend continued monitoring. To call if worsening symptoms. Discussed could trial coconut oil as well and see if that helps better.

## 2022-01-05 ENCOUNTER — APPOINTMENT (OUTPATIENT)
Dept: CT IMAGING | Facility: HOSPITAL | Age: 27
End: 2022-01-05

## 2022-01-05 ENCOUNTER — APPOINTMENT (OUTPATIENT)
Dept: GENERAL RADIOLOGY | Facility: HOSPITAL | Age: 27
End: 2022-01-05

## 2022-01-05 ENCOUNTER — HOSPITAL ENCOUNTER (EMERGENCY)
Facility: HOSPITAL | Age: 27
Discharge: ANOTHER HOSPITAL | End: 2022-01-06
Attending: EMERGENCY MEDICINE | Admitting: EMERGENCY MEDICINE

## 2022-01-05 DIAGNOSIS — S32.001A CLOSED BURST FRACTURE OF LUMBAR VERTEBRA, INITIAL ENCOUNTER: ICD-10-CM
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
## 2022-01-05 LAB
ALBUMIN SERPL-MCNC: 4.5 G/DL (ref 3.5–5.2)
ALBUMIN/GLOB SERPL: 1.6 G/DL
ALP SERPL-CCNC: 69 U/L (ref 39–117)
ALT SERPL W P-5'-P-CCNC: 35 U/L (ref 1–33)
AMYLASE SERPL-CCNC: 58 U/L (ref 28–100)
ANION GAP SERPL CALCULATED.3IONS-SCNC: 10 MMOL/L (ref 5–15)
AST SERPL-CCNC: 30 U/L (ref 1–32)
B-HCG UR QL: NEGATIVE
BACTERIA UR QL AUTO: ABNORMAL /HPF
BASOPHILS # BLD AUTO: 0.03 10*3/MM3 (ref 0–0.2)
BASOPHILS NFR BLD AUTO: 0.2 % (ref 0–1.5)
BILIRUB SERPL-MCNC: 0.3 MG/DL (ref 0–1.2)
BILIRUB UR QL STRIP: NEGATIVE
BUN SERPL-MCNC: 14 MG/DL (ref 6–20)
BUN/CREAT SERPL: 17.1 (ref 7–25)
CALCIUM SPEC-SCNC: 9.1 MG/DL (ref 8.6–10.5)
CHLORIDE SERPL-SCNC: 102 MMOL/L (ref 98–107)
CLARITY UR: CLEAR
CO2 SERPL-SCNC: 26 MMOL/L (ref 22–29)
COLOR UR: YELLOW
CREAT SERPL-MCNC: 0.82 MG/DL (ref 0.57–1)
DEPRECATED RDW RBC AUTO: 36.2 FL (ref 37–54)
EOSINOPHIL # BLD AUTO: 0.01 10*3/MM3 (ref 0–0.4)
EOSINOPHIL NFR BLD AUTO: 0.1 % (ref 0.3–6.2)
ERYTHROCYTE [DISTWIDTH] IN BLOOD BY AUTOMATED COUNT: 11.6 % (ref 12.3–15.4)
GFR SERPL CREATININE-BSD FRML MDRD: 86 ML/MIN/1.73
GLOBULIN UR ELPH-MCNC: 2.9 GM/DL
GLUCOSE SERPL-MCNC: 108 MG/DL (ref 65–99)
GLUCOSE UR STRIP-MCNC: NEGATIVE MG/DL
HCT VFR BLD AUTO: 37 % (ref 34–46.6)
HGB BLD-MCNC: 12.9 G/DL (ref 12–15.9)
HGB UR QL STRIP.AUTO: ABNORMAL
HYALINE CASTS UR QL AUTO: ABNORMAL /LPF
IMM GRANULOCYTES # BLD AUTO: 0.09 10*3/MM3 (ref 0–0.05)
IMM GRANULOCYTES NFR BLD AUTO: 0.6 % (ref 0–0.5)
KETONES UR QL STRIP: ABNORMAL
LEUKOCYTE ESTERASE UR QL STRIP.AUTO: NEGATIVE
LIPASE SERPL-CCNC: 16 U/L (ref 13–60)
LYMPHOCYTES # BLD AUTO: 1.11 10*3/MM3 (ref 0.7–3.1)
LYMPHOCYTES NFR BLD AUTO: 7.5 % (ref 19.6–45.3)
MCH RBC QN AUTO: 30 PG (ref 26.6–33)
MCHC RBC AUTO-ENTMCNC: 34.9 G/DL (ref 31.5–35.7)
MCV RBC AUTO: 86 FL (ref 79–97)
MONOCYTES # BLD AUTO: 0.54 10*3/MM3 (ref 0.1–0.9)
MONOCYTES NFR BLD AUTO: 3.7 % (ref 5–12)
NEUTROPHILS NFR BLD AUTO: 13.01 10*3/MM3 (ref 1.7–7)
NEUTROPHILS NFR BLD AUTO: 87.9 % (ref 42.7–76)
NITRITE UR QL STRIP: NEGATIVE
NRBC BLD AUTO-RTO: 0 /100 WBC (ref 0–0.2)
PH UR STRIP.AUTO: 7 [PH] (ref 5–9)
PLATELET # BLD AUTO: 232 10*3/MM3 (ref 140–450)
PMV BLD AUTO: 9.9 FL (ref 6–12)
POTASSIUM SERPL-SCNC: 3.7 MMOL/L (ref 3.5–5.2)
PROT SERPL-MCNC: 7.4 G/DL (ref 6–8.5)
PROT UR QL STRIP: ABNORMAL
RBC # BLD AUTO: 4.3 10*6/MM3 (ref 3.77–5.28)
RBC # UR STRIP: ABNORMAL /HPF
REF LAB TEST METHOD: ABNORMAL
SODIUM SERPL-SCNC: 138 MMOL/L (ref 136–145)
SP GR UR STRIP: 1.03 (ref 1–1.03)
SQUAMOUS #/AREA URNS HPF: ABNORMAL /HPF
UROBILINOGEN UR QL STRIP: ABNORMAL
WBC # UR STRIP: ABNORMAL /HPF
WBC NRBC COR # BLD: 14.79 10*3/MM3 (ref 3.4–10.8)

## 2022-01-05 PROCEDURE — 96361 HYDRATE IV INFUSION ADD-ON: CPT

## 2022-01-05 PROCEDURE — 36415 COLL VENOUS BLD VENIPUNCTURE: CPT

## 2022-01-05 PROCEDURE — 82150 ASSAY OF AMYLASE: CPT | Performed by: EMERGENCY MEDICINE

## 2022-01-05 PROCEDURE — 72100 X-RAY EXAM L-S SPINE 2/3 VWS: CPT

## 2022-01-05 PROCEDURE — 80053 COMPREHEN METABOLIC PANEL: CPT | Performed by: EMERGENCY MEDICINE

## 2022-01-05 PROCEDURE — 83690 ASSAY OF LIPASE: CPT | Performed by: EMERGENCY MEDICINE

## 2022-01-05 PROCEDURE — 25010000002 IOPAMIDOL 61 % SOLUTION: Performed by: EMERGENCY MEDICINE

## 2022-01-05 PROCEDURE — 25010000002 MORPHINE PER 10 MG: Performed by: EMERGENCY MEDICINE

## 2022-01-05 PROCEDURE — 72131 CT LUMBAR SPINE W/O DYE: CPT

## 2022-01-05 PROCEDURE — 25010000002 ONDANSETRON PER 1 MG: Performed by: EMERGENCY MEDICINE

## 2022-01-05 PROCEDURE — 74177 CT ABD & PELVIS W/CONTRAST: CPT

## 2022-01-05 PROCEDURE — 96375 TX/PRO/DX INJ NEW DRUG ADDON: CPT

## 2022-01-05 PROCEDURE — 72072 X-RAY EXAM THORAC SPINE 3VWS: CPT

## 2022-01-05 PROCEDURE — 73130 X-RAY EXAM OF HAND: CPT

## 2022-01-05 PROCEDURE — 85025 COMPLETE CBC W/AUTO DIFF WBC: CPT | Performed by: EMERGENCY MEDICINE

## 2022-01-05 PROCEDURE — 81001 URINALYSIS AUTO W/SCOPE: CPT | Performed by: EMERGENCY MEDICINE

## 2022-01-05 PROCEDURE — 72125 CT NECK SPINE W/O DYE: CPT

## 2022-01-05 PROCEDURE — 96374 THER/PROPH/DIAG INJ IV PUSH: CPT

## 2022-01-05 PROCEDURE — 72128 CT CHEST SPINE W/O DYE: CPT

## 2022-01-05 PROCEDURE — 81025 URINE PREGNANCY TEST: CPT | Performed by: EMERGENCY MEDICINE

## 2022-01-05 PROCEDURE — 71045 X-RAY EXAM CHEST 1 VIEW: CPT

## 2022-01-05 RX ORDER — SODIUM CHLORIDE 0.9 % (FLUSH) 0.9 %
10 SYRINGE (ML) INJECTION AS NEEDED
Status: DISCONTINUED | OUTPATIENT
Start: 2022-01-05 | End: 2022-01-06 | Stop reason: HOSPADM

## 2022-01-05 RX ORDER — ONDANSETRON 2 MG/ML
4 INJECTION INTRAMUSCULAR; INTRAVENOUS ONCE
Status: COMPLETED | OUTPATIENT
Start: 2022-01-05 | End: 2022-01-05

## 2022-01-05 RX ORDER — LEVONORGESTREL AND ETHINYL ESTRADIOL 0.15-0.03
KIT ORAL
Qty: 84 TABLET | Refills: 4 | OUTPATIENT
Start: 2022-01-05

## 2022-01-05 RX ORDER — SODIUM CHLORIDE 9 MG/ML
125 INJECTION, SOLUTION INTRAVENOUS CONTINUOUS
Status: DISCONTINUED | OUTPATIENT
Start: 2022-01-05 | End: 2022-01-06 | Stop reason: HOSPADM

## 2022-01-05 RX ADMIN — ONDANSETRON 4 MG: 2 INJECTION INTRAMUSCULAR; INTRAVENOUS at 21:22

## 2022-01-05 RX ADMIN — IOPAMIDOL 90 ML: 612 INJECTION, SOLUTION INTRAVENOUS at 21:44

## 2022-01-05 RX ADMIN — SODIUM CHLORIDE 125 ML/HR: 9 INJECTION, SOLUTION INTRAVENOUS at 21:22

## 2022-01-05 RX ADMIN — MORPHINE SULFATE 4 MG: 4 INJECTION INTRAVENOUS at 21:21

## 2022-01-06 VITALS
SYSTOLIC BLOOD PRESSURE: 107 MMHG | HEART RATE: 72 BPM | RESPIRATION RATE: 18 BRPM | OXYGEN SATURATION: 98 % | TEMPERATURE: 97.7 F | WEIGHT: 165 LBS | HEIGHT: 65 IN | DIASTOLIC BLOOD PRESSURE: 57 MMHG | BODY MASS INDEX: 27.49 KG/M2

## 2022-01-06 PROCEDURE — 99284 EMERGENCY DEPT VISIT MOD MDM: CPT

## 2022-01-06 PROCEDURE — 96376 TX/PRO/DX INJ SAME DRUG ADON: CPT

## 2022-01-06 PROCEDURE — 25010000002 MORPHINE PER 10 MG: Performed by: EMERGENCY MEDICINE

## 2022-01-06 PROCEDURE — 96361 HYDRATE IV INFUSION ADD-ON: CPT

## 2022-01-06 RX ADMIN — MORPHINE SULFATE 4 MG: 4 INJECTION INTRAVENOUS at 01:00

## 2022-01-06 RX ADMIN — SODIUM CHLORIDE 125 ML/HR: 9 INJECTION, SOLUTION INTRAVENOUS at 01:00
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
## 2022-01-17 ENCOUNTER — OFFICE VISIT (OUTPATIENT)
Dept: FAMILY MEDICINE CLINIC | Facility: CLINIC | Age: 27
End: 2022-01-17

## 2022-01-17 VITALS
HEART RATE: 73 BPM | RESPIRATION RATE: 22 BRPM | DIASTOLIC BLOOD PRESSURE: 62 MMHG | HEIGHT: 65 IN | WEIGHT: 174.5 LBS | BODY MASS INDEX: 29.07 KG/M2 | SYSTOLIC BLOOD PRESSURE: 110 MMHG | OXYGEN SATURATION: 98 %

## 2022-01-17 DIAGNOSIS — M54.50 LUMBAR BACK PAIN: ICD-10-CM

## 2022-01-17 DIAGNOSIS — Z76.89 ENCOUNTER TO ESTABLISH CARE: Primary | ICD-10-CM

## 2022-01-17 PROCEDURE — 99213 OFFICE O/P EST LOW 20 MIN: CPT | Performed by: NURSE PRACTITIONER
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
## 2022-01-20 ENCOUNTER — PATIENT ROUNDING (BHMG ONLY) (OUTPATIENT)
Dept: FAMILY MEDICINE CLINIC | Facility: CLINIC | Age: 27
End: 2022-01-20

## 2022-01-20 ENCOUNTER — PATIENT OUTREACH (OUTPATIENT)
Dept: FAMILY MEDICINE CLINIC | Facility: CLINIC | Age: 27
End: 2022-01-20

## 2022-01-20 NOTE — PROGRESS NOTES
January 20, 2022    Hello, may I speak with Sandra Botello?    My name is  Tanika Vera    I am  with [facility] [address] [place] [address] PRIMARY CARE - [address] [address] [phone].    Before we get started may I verify your date of birth? 1995    I am calling to officially welcome you to our practice and ask about your recent visit. Is this a good time to talk? Yes    Tell me about your visit with us. What things went well?  I was very pleased with how professional and sweet everyone was     We're always looking for ways to make our patients' experiences even better. Do you have recommendations on ways we may improve?  No    Overall were you satisfied with your first visit to our practice? Completely       I appreciate you taking the time to speak with me today. Is there anything else I can do for you? No      Thank you, and have a great day.

## 2022-03-08 ENCOUNTER — OFFICE VISIT (OUTPATIENT)
Dept: FAMILY MEDICINE CLINIC | Facility: CLINIC | Age: 27
End: 2022-03-08

## 2022-03-08 VITALS
WEIGHT: 177.7 LBS | RESPIRATION RATE: 24 BRPM | SYSTOLIC BLOOD PRESSURE: 120 MMHG | HEIGHT: 65 IN | OXYGEN SATURATION: 98 % | HEART RATE: 91 BPM | BODY MASS INDEX: 29.61 KG/M2 | DIASTOLIC BLOOD PRESSURE: 66 MMHG

## 2022-03-08 DIAGNOSIS — K92.1 BLOOD IN STOOL: Primary | ICD-10-CM

## 2022-03-08 DIAGNOSIS — K64.0 GRADE I HEMORRHOIDS: ICD-10-CM

## 2022-03-08 PROCEDURE — 99213 OFFICE O/P EST LOW 20 MIN: CPT | Performed by: NURSE PRACTITIONER

## 2022-03-08 RX ORDER — HYDROCORTISONE ACETATE 25 MG/1
25 SUPPOSITORY RECTAL 2 TIMES DAILY
Qty: 28 SUPPOSITORY | Refills: 0 | Status: SHIPPED | OUTPATIENT
Start: 2022-03-08 | End: 2022-03-29 | Stop reason: SDUPTHER

## 2022-03-08 RX ORDER — DOCUSATE SODIUM 100 MG/1
100 CAPSULE, LIQUID FILLED ORAL 2 TIMES DAILY
Qty: 60 CAPSULE | Refills: 0 | Status: SHIPPED | OUTPATIENT
Start: 2022-03-08 | End: 2022-11-29

## 2022-03-08 NOTE — PROGRESS NOTES
Chief Complaint  Digestive problems    Subjective          Sandra Botello presents to [facility] - [place] with issues regarding some bleeding when she has a bowel movement. She was previously on prescribed pain medication and constipated when it first started, her bowel movements are now normal, however the bleeding still happens off and on. Patient described the bleeding as red, and streaked, does not get onto underwear, very small amount when she wipes and mild itching after a BM.       Rectal Bleeding  This is a new problem. The problem occurs intermittently. The problem has been waxing and waning. Pertinent negatives include no arthralgias, chest pain, chills, congestion, coughing, myalgias, nausea, sore throat or weakness. She has tried nothing for the symptoms.     No outpatient medications prior to visit.     No facility-administered medications prior to visit.       Review of Systems   Constitutional: Negative for activity change, appetite change and chills.   HENT: Negative for congestion, ear pain, sore throat and trouble swallowing.    Eyes: Negative for discharge, itching and visual disturbance.   Respiratory: Negative for apnea, cough and wheezing.    Cardiovascular: Negative for chest pain and leg swelling.   Gastrointestinal: Positive for anal bleeding and hematochezia. Negative for abdominal distention, constipation, diarrhea and nausea.   Endocrine: Negative for cold intolerance, heat intolerance and polyuria.   Genitourinary: Negative for dysuria, frequency and urgency.   Musculoskeletal: Negative for arthralgias, back pain and myalgias.   Skin: Negative for color change, pallor and wound.   Neurological: Negative for dizziness, seizures, syncope, weakness and light-headedness.   Psychiatric/Behavioral: Negative for agitation, confusion and sleep disturbance. The patient is not nervous/anxious.          Objective   Vital Signs:   Visit Vitals  BP  "120/66 (BP Location: Right arm, Patient Position: Sitting, Cuff Size: Adult)   Pulse 91   Resp 24   Ht 165.1 cm (65\")   Wt 80.6 kg (177 lb 11.2 oz)   LMP 03/01/2022 (Approximate)   SpO2 98%   BMI 29.57 kg/m²     Physical Exam  Vitals and nursing note reviewed.   Constitutional:       Appearance: She is well-developed.   HENT:      Head: Normocephalic and atraumatic.   Eyes:      General: Lids are normal.      Conjunctiva/sclera: Conjunctivae normal.   Neck:      Thyroid: No thyroid mass or thyromegaly.      Trachea: Trachea normal. No tracheal tenderness.   Cardiovascular:      Rate and Rhythm: Normal rate.      Pulses: Normal pulses.      Heart sounds: Normal heart sounds.   Pulmonary:      Effort: Pulmonary effort is normal. No respiratory distress.      Breath sounds: Normal breath sounds. No wheezing.   Abdominal:      General: There is no distension.      Palpations: Abdomen is soft. There is no mass.   Genitourinary:     Rectum: Normal. No mass, tenderness, anal fissure or external hemorrhoid.   Musculoskeletal:         General: Normal range of motion.      Cervical back: Normal range of motion. No edema.   Lymphadenopathy:      Head:      Right side of head: No submental, submandibular or tonsillar adenopathy.      Left side of head: No submental, submandibular or tonsillar adenopathy.   Skin:     General: Skin is warm and dry.      Coloration: Skin is not pale.      Findings: No abrasion, erythema or lesion.   Neurological:      Mental Status: She is alert and oriented to person, place, and time.   Psychiatric:         Mood and Affect: Mood is not anxious. Affect is not inappropriate.         Speech: Speech normal.         Behavior: Behavior normal.         Thought Content: Thought content normal.         Judgment: Judgment normal. Judgment is not impulsive.        Result Review :                 Assessment and Plan    Diagnoses and all orders for this visit:    1. Blood in stool (Primary)  -     " hydrocortisone (ANUSOL-HC) 25 MG suppository; Insert 1 suppository into the rectum 2 (Two) Times a Day.  Dispense: 28 suppository; Refill: 0  -     docusate sodium (Colace) 100 MG capsule; Take 1 capsule by mouth 2 (Two) Times a Day.  Dispense: 60 capsule; Refill: 0    2. Grade I hemorrhoids  -     hydrocortisone (ANUSOL-HC) 25 MG suppository; Insert 1 suppository into the rectum 2 (Two) Times a Day.  Dispense: 28 suppository; Refill: 0  -     docusate sodium (Colace) 100 MG capsule; Take 1 capsule by mouth 2 (Two) Times a Day.  Dispense: 60 capsule; Refill: 0      No bleeding today, no hemorrhoids visualized       Increase water intake    Please call the office if bleeding worsens   We discussed possible referral to GI     Continue to use stool softeners, we will add suppositories         I spent 26 minutes caring for Sandra on this date of service. This time includes time spent by me in the following activities:preparing for the visit, performing a medically appropriate examination and/or evaluation , counseling and educating the patient/family/caregiver, ordering medications, tests, or procedures and documenting information in the medical record  Follow Up   Return in about 3 months (around 6/8/2022), or if symptoms worsen or fail to improve.  Patient was given instructions and counseling regarding her condition or for health maintenance advice. Please see specific information pulled into the AVS if appropriate.           This document has been electronically signed by JOSE Jang on March 8, 2022 15:21 CST

## 2022-03-29 ENCOUNTER — OFFICE VISIT (OUTPATIENT)
Dept: FAMILY MEDICINE CLINIC | Facility: CLINIC | Age: 27
End: 2022-03-29

## 2022-03-29 VITALS
HEART RATE: 101 BPM | BODY MASS INDEX: 29.94 KG/M2 | RESPIRATION RATE: 24 BRPM | WEIGHT: 179.7 LBS | HEIGHT: 65 IN | DIASTOLIC BLOOD PRESSURE: 66 MMHG | OXYGEN SATURATION: 99 % | SYSTOLIC BLOOD PRESSURE: 118 MMHG

## 2022-03-29 DIAGNOSIS — K92.1 BLOOD IN STOOL: ICD-10-CM

## 2022-03-29 DIAGNOSIS — K64.0 GRADE I HEMORRHOIDS: ICD-10-CM

## 2022-03-29 PROCEDURE — 99213 OFFICE O/P EST LOW 20 MIN: CPT | Performed by: NURSE PRACTITIONER

## 2022-03-29 RX ORDER — HYDROCORTISONE 25 MG/G
CREAM TOPICAL 2 TIMES DAILY
Qty: 28 G | Refills: 3 | Status: SHIPPED | OUTPATIENT
Start: 2022-03-29 | End: 2022-11-29

## 2022-03-29 RX ORDER — HYDROCORTISONE ACETATE 25 MG/1
25 SUPPOSITORY RECTAL 2 TIMES DAILY
Qty: 28 SUPPOSITORY | Refills: 6 | Status: SHIPPED | OUTPATIENT
Start: 2022-03-29 | End: 2022-11-29

## 2022-03-29 NOTE — PROGRESS NOTES
"Chief Complaint  Hemorrhoids    Subjective          Sandra Botello presents to [facility] - [place] with concerns regarding hemorrhoids. She notices with some bowel movements blood in stool/ when she wipes. Noticed this morning after BM what felt like a \"raisin\" sized spot. Patient also states that she recently found out she was pregnant.   Hemorrhoids  This is a recurrent problem. The current episode started 1 to 4 weeks ago. The problem occurs every several days. The problem has been unchanged. Pertinent negatives include no arthralgias, chest pain, chills, congestion, coughing, myalgias, nausea, sore throat or weakness. Exacerbated by: BM. Treatments tried: anusol. The treatment provided mild relief.   Rectal Bleeding  This is a recurrent problem. The problem occurs intermittently. The problem has been waxing and waning. Pertinent negatives include no arthralgias, chest pain, chills, congestion, coughing, myalgias, nausea, sore throat or weakness. She has tried nothing for the symptoms.     Outpatient Medications Prior to Visit   Medication Sig Dispense Refill   • docusate sodium (Colace) 100 MG capsule Take 1 capsule by mouth 2 (Two) Times a Day. 60 capsule 0   • hydrocortisone (ANUSOL-HC) 25 MG suppository Insert 1 suppository into the rectum 2 (Two) Times a Day. 28 suppository 0     No facility-administered medications prior to visit.       Review of Systems   Constitutional: Negative for activity change, appetite change and chills.   HENT: Negative for congestion, ear pain, sore throat and trouble swallowing.    Eyes: Negative for discharge, itching and visual disturbance.   Respiratory: Negative for apnea, cough and wheezing.    Cardiovascular: Negative for chest pain and leg swelling.   Gastrointestinal: Positive for hematochezia and hemorrhoids. Negative for abdominal distention, constipation, diarrhea and nausea.   Endocrine: Negative for cold " "intolerance, heat intolerance and polyuria.   Genitourinary: Negative for dysuria, frequency and urgency.   Musculoskeletal: Negative for arthralgias, back pain and myalgias.   Skin: Negative for color change, pallor and wound.   Neurological: Negative for dizziness, seizures, syncope, weakness and light-headedness.   Psychiatric/Behavioral: Negative for agitation, confusion and sleep disturbance. The patient is not nervous/anxious.          Objective   Vital Signs:   Visit Vitals  /66 (BP Location: Right arm, Patient Position: Sitting, Cuff Size: Adult)   Pulse 101   Resp 24   Ht 165.1 cm (65\")   Wt 81.5 kg (179 lb 11.2 oz)   LMP 03/01/2022 (Approximate)   SpO2 99%   BMI 29.90 kg/m²     Physical Exam  Vitals and nursing note reviewed.   Constitutional:       Appearance: She is well-developed.   HENT:      Head: Normocephalic and atraumatic.   Eyes:      General: Lids are normal.      Conjunctiva/sclera: Conjunctivae normal.   Neck:      Thyroid: No thyroid mass or thyromegaly.      Trachea: Trachea normal. No tracheal tenderness.   Cardiovascular:      Rate and Rhythm: Normal rate.      Pulses: Normal pulses.      Heart sounds: Normal heart sounds.   Pulmonary:      Effort: Pulmonary effort is normal. No respiratory distress.      Breath sounds: Normal breath sounds. No wheezing.   Abdominal:      General: There is no distension.      Palpations: Abdomen is soft. There is no mass.   Genitourinary:     Rectum: Internal hemorrhoid present.          Comments: Internal hemorrhoid palpated    Musculoskeletal:         General: Normal range of motion.      Cervical back: Normal range of motion. No edema.   Lymphadenopathy:      Head:      Right side of head: No submental, submandibular or tonsillar adenopathy.      Left side of head: No submental, submandibular or tonsillar adenopathy.   Skin:     General: Skin is warm and dry.      Coloration: Skin is not pale.      Findings: No abrasion, erythema or lesion. "   Neurological:      Mental Status: She is alert and oriented to person, place, and time.   Psychiatric:         Mood and Affect: Mood is not anxious. Affect is not inappropriate.         Speech: Speech normal.         Behavior: Behavior normal.         Thought Content: Thought content normal.         Judgment: Judgment normal. Judgment is not impulsive.        Result Review :                 Assessment and Plan    Diagnoses and all orders for this visit:    1. Blood in stool    2. Grade I hemorrhoids  -     hydrocortisone (ANUSOL-HC) 25 MG suppository; Insert 1 suppository into the rectum 2 (Two) Times a Day.  Dispense: 28 suppository; Refill: 6  -     Hydrocortisone, Perianal, (ANUSOL-HC) 2.5 % rectal cream; Insert  into the rectum 2 (Two) Times a Day.  Dispense: 28 g; Refill: 3      Small internal hemorrhoid     May use suppository as needed    Use Rectal cream BID     Continue current medication    A high fiber diet with plenty of fluids (up to 8 glasses of water daily) is suggested to relieve these symptoms.        Follow Up   Return if symptoms worsen or fail to improve, for Next scheduled follow up.  Patient was given instructions and counseling regarding her condition or for health maintenance advice. Please see specific information pulled into the AVS if appropriate.           This document has been electronically signed by JOSE Jang on March 29, 2022 13:56 CDT

## 2022-04-05 ENCOUNTER — LAB (OUTPATIENT)
Dept: LAB | Facility: HOSPITAL | Age: 27
End: 2022-04-05

## 2022-04-05 ENCOUNTER — INITIAL PRENATAL (OUTPATIENT)
Dept: OBSTETRICS AND GYNECOLOGY | Facility: CLINIC | Age: 27
End: 2022-04-05

## 2022-04-05 VITALS — BODY MASS INDEX: 29.69 KG/M2 | SYSTOLIC BLOOD PRESSURE: 102 MMHG | DIASTOLIC BLOOD PRESSURE: 60 MMHG | WEIGHT: 178.4 LBS

## 2022-04-05 DIAGNOSIS — O98.319 GENITAL HERPES AFFECTING PREGNANCY, ANTEPARTUM: ICD-10-CM

## 2022-04-05 DIAGNOSIS — Z32.00 PREGNANCY EXAMINATION OR TEST, PREGNANCY UNCONFIRMED: ICD-10-CM

## 2022-04-05 DIAGNOSIS — Z76.89 ENCOUNTER TO ESTABLISH CARE: ICD-10-CM

## 2022-04-05 DIAGNOSIS — A60.09 GENITAL HERPES AFFECTING PREGNANCY, ANTEPARTUM: ICD-10-CM

## 2022-04-05 DIAGNOSIS — Z36.87 UNSURE OF LMP (LAST MENSTRUAL PERIOD) AS REASON FOR ULTRASOUND SCAN: ICD-10-CM

## 2022-04-05 DIAGNOSIS — O09.299 HISTORY OF MISCARRIAGE, CURRENTLY PREGNANT, UNSPECIFIED TRIMESTER: Primary | ICD-10-CM

## 2022-04-05 PROBLEM — S32.010A CLOSED COMPRESSION FRACTURE OF BODY OF L1 VERTEBRA (HCC): Status: ACTIVE | Noted: 2022-01-06

## 2022-04-05 LAB
25(OH)D3 SERPL-MCNC: 21 NG/ML (ref 30–100)
ABO GROUP BLD: NORMAL
ALBUMIN SERPL-MCNC: 4.7 G/DL (ref 3.5–5.2)
ALBUMIN/GLOB SERPL: 1.7 G/DL
ALP SERPL-CCNC: 61 U/L (ref 39–117)
ALT SERPL W P-5'-P-CCNC: 16 U/L (ref 1–33)
AMPHET+METHAMPHET UR QL: NEGATIVE
AMPHETAMINES UR QL: NEGATIVE
ANION GAP SERPL CALCULATED.3IONS-SCNC: 11.9 MMOL/L (ref 5–15)
AST SERPL-CCNC: 16 U/L (ref 1–32)
B-HCG UR QL: POSITIVE
BARBITURATES UR QL SCN: NEGATIVE
BASOPHILS # BLD AUTO: 0.02 10*3/MM3 (ref 0–0.2)
BASOPHILS NFR BLD AUTO: 0.3 % (ref 0–1.5)
BENZODIAZ UR QL SCN: NEGATIVE
BILIRUB SERPL-MCNC: 0.4 MG/DL (ref 0–1.2)
BILIRUB UR QL STRIP: NEGATIVE
BLD GP AB SCN SERPL QL: NEGATIVE
BUN SERPL-MCNC: 7 MG/DL (ref 6–20)
BUN/CREAT SERPL: 11.5 (ref 7–25)
BUPRENORPHINE SERPL-MCNC: NEGATIVE NG/ML
CALCIUM SPEC-SCNC: 9.1 MG/DL (ref 8.6–10.5)
CANNABINOIDS SERPL QL: NEGATIVE
CHLORIDE SERPL-SCNC: 102 MMOL/L (ref 98–107)
CHOLEST SERPL-MCNC: 151 MG/DL (ref 0–200)
CLARITY UR: CLEAR
CO2 SERPL-SCNC: 22.1 MMOL/L (ref 22–29)
COCAINE UR QL: NEGATIVE
COLOR UR: YELLOW
CREAT SERPL-MCNC: 0.61 MG/DL (ref 0.57–1)
DEPRECATED RDW RBC AUTO: 36 FL (ref 37–54)
EGFRCR SERPLBLD CKD-EPI 2021: 127.4 ML/MIN/1.73
EOSINOPHIL # BLD AUTO: 0.03 10*3/MM3 (ref 0–0.4)
EOSINOPHIL NFR BLD AUTO: 0.4 % (ref 0.3–6.2)
ERYTHROCYTE [DISTWIDTH] IN BLOOD BY AUTOMATED COUNT: 11.8 % (ref 12.3–15.4)
EXPIRATION DATE: ABNORMAL
GLOBULIN UR ELPH-MCNC: 2.8 GM/DL
GLUCOSE SERPL-MCNC: 77 MG/DL (ref 65–99)
GLUCOSE UR STRIP-MCNC: NEGATIVE MG/DL
HBV SURFACE AG SERPL QL IA: NORMAL
HCG INTACT+B SERPL-ACNC: NORMAL MIU/ML
HCT VFR BLD AUTO: 37 % (ref 34–46.6)
HCV AB SER DONR QL: NORMAL
HDLC SERPL-MCNC: 49 MG/DL (ref 40–60)
HGB BLD-MCNC: 13.1 G/DL (ref 12–15.9)
HGB UR QL STRIP.AUTO: NEGATIVE
HIV1+2 AB SER QL: NORMAL
HOLD SPECIMEN: NORMAL
IMM GRANULOCYTES # BLD AUTO: 0.06 10*3/MM3 (ref 0–0.05)
IMM GRANULOCYTES NFR BLD AUTO: 0.9 % (ref 0–0.5)
INTERNAL NEGATIVE CONTROL: NEGATIVE
INTERNAL POSITIVE CONTROL: POSITIVE
KETONES UR QL STRIP: NEGATIVE
LDLC SERPL CALC-MCNC: 89 MG/DL (ref 0–100)
LDLC/HDLC SERPL: 1.81 {RATIO}
LEUKOCYTE ESTERASE UR QL STRIP.AUTO: NEGATIVE
LYMPHOCYTES # BLD AUTO: 1.69 10*3/MM3 (ref 0.7–3.1)
LYMPHOCYTES NFR BLD AUTO: 24.1 % (ref 19.6–45.3)
Lab: ABNORMAL
Lab: NORMAL
MCH RBC QN AUTO: 30.1 PG (ref 26.6–33)
MCHC RBC AUTO-ENTMCNC: 35.4 G/DL (ref 31.5–35.7)
MCV RBC AUTO: 85.1 FL (ref 79–97)
METHADONE UR QL SCN: NEGATIVE
MONOCYTES # BLD AUTO: 0.25 10*3/MM3 (ref 0.1–0.9)
MONOCYTES NFR BLD AUTO: 3.6 % (ref 5–12)
NEUTROPHILS NFR BLD AUTO: 4.96 10*3/MM3 (ref 1.7–7)
NEUTROPHILS NFR BLD AUTO: 70.7 % (ref 42.7–76)
NITRITE UR QL STRIP: NEGATIVE
NRBC BLD AUTO-RTO: 0 /100 WBC (ref 0–0.2)
OPIATES UR QL: NEGATIVE
OXYCODONE UR QL SCN: NEGATIVE
PCP UR QL SCN: NEGATIVE
PH UR STRIP.AUTO: 5.5 [PH] (ref 5–8)
PLATELET # BLD AUTO: 206 10*3/MM3 (ref 140–450)
PMV BLD AUTO: 9.7 FL (ref 6–12)
POTASSIUM SERPL-SCNC: 3.6 MMOL/L (ref 3.5–5.2)
PROPOXYPH UR QL: NEGATIVE
PROT SERPL-MCNC: 7.5 G/DL (ref 6–8.5)
PROT UR QL STRIP: NEGATIVE
RBC # BLD AUTO: 4.35 10*6/MM3 (ref 3.77–5.28)
RH BLD: POSITIVE
RPR SER QL: NORMAL
SODIUM SERPL-SCNC: 136 MMOL/L (ref 136–145)
SP GR UR STRIP: 1.02 (ref 1–1.03)
TRICYCLICS UR QL SCN: NEGATIVE
TRIGL SERPL-MCNC: 67 MG/DL (ref 0–150)
TSH SERPL DL<=0.05 MIU/L-ACNC: 2.24 UIU/ML (ref 0.27–4.2)
UROBILINOGEN UR QL STRIP: NORMAL
VIT B12 BLD-MCNC: 300 PG/ML (ref 211–946)
VLDLC SERPL-MCNC: 13 MG/DL (ref 5–40)
WBC NRBC COR # BLD: 7.01 10*3/MM3 (ref 3.4–10.8)

## 2022-04-05 PROCEDURE — 86592 SYPHILIS TEST NON-TREP QUAL: CPT | Performed by: NURSE PRACTITIONER

## 2022-04-05 PROCEDURE — 86901 BLOOD TYPING SEROLOGIC RH(D): CPT | Performed by: NURSE PRACTITIONER

## 2022-04-05 PROCEDURE — 87340 HEPATITIS B SURFACE AG IA: CPT | Performed by: NURSE PRACTITIONER

## 2022-04-05 PROCEDURE — 87491 CHLMYD TRACH DNA AMP PROBE: CPT | Performed by: NURSE PRACTITIONER

## 2022-04-05 PROCEDURE — 36415 COLL VENOUS BLD VENIPUNCTURE: CPT

## 2022-04-05 PROCEDURE — 86696 HERPES SIMPLEX TYPE 2 TEST: CPT | Performed by: NURSE PRACTITIONER

## 2022-04-05 PROCEDURE — 87661 TRICHOMONAS VAGINALIS AMPLIF: CPT | Performed by: NURSE PRACTITIONER

## 2022-04-05 PROCEDURE — G0432 EIA HIV-1/HIV-2 SCREEN: HCPCS | Performed by: NURSE PRACTITIONER

## 2022-04-05 PROCEDURE — 86900 BLOOD TYPING SEROLOGIC ABO: CPT | Performed by: NURSE PRACTITIONER

## 2022-04-05 PROCEDURE — 81003 URINALYSIS AUTO W/O SCOPE: CPT | Performed by: NURSE PRACTITIONER

## 2022-04-05 PROCEDURE — 86850 RBC ANTIBODY SCREEN: CPT | Performed by: NURSE PRACTITIONER

## 2022-04-05 PROCEDURE — 86803 HEPATITIS C AB TEST: CPT | Performed by: NURSE PRACTITIONER

## 2022-04-05 PROCEDURE — 87086 URINE CULTURE/COLONY COUNT: CPT | Performed by: NURSE PRACTITIONER

## 2022-04-05 PROCEDURE — 81025 URINE PREGNANCY TEST: CPT | Performed by: NURSE PRACTITIONER

## 2022-04-05 PROCEDURE — 87591 N.GONORRHOEAE DNA AMP PROB: CPT | Performed by: NURSE PRACTITIONER

## 2022-04-05 PROCEDURE — 84702 CHORIONIC GONADOTROPIN TEST: CPT | Performed by: NURSE PRACTITIONER

## 2022-04-05 PROCEDURE — 80061 LIPID PANEL: CPT

## 2022-04-05 PROCEDURE — 80050 GENERAL HEALTH PANEL: CPT | Performed by: NURSE PRACTITIONER

## 2022-04-05 PROCEDURE — 86762 RUBELLA ANTIBODY: CPT | Performed by: NURSE PRACTITIONER

## 2022-04-05 PROCEDURE — 82607 VITAMIN B-12: CPT

## 2022-04-05 PROCEDURE — 82306 VITAMIN D 25 HYDROXY: CPT

## 2022-04-05 PROCEDURE — 80306 DRUG TEST PRSMV INSTRMNT: CPT | Performed by: NURSE PRACTITIONER

## 2022-04-05 PROCEDURE — 86695 HERPES SIMPLEX TYPE 1 TEST: CPT | Performed by: NURSE PRACTITIONER

## 2022-04-05 RX ORDER — PRENATAL VIT NO.126/IRON/FOLIC 28MG-0.8MG
TABLET ORAL DAILY
COMMUNITY
End: 2022-11-29
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
## 2022-04-06 LAB
BACTERIA SPEC AEROBE CULT: NORMAL
C TRACH RRNA CVX QL NAA+PROBE: NEGATIVE
HSV1 IGG SER IA-ACNC: 3.58 INDEX (ref 0–0.9)
HSV2 IGG SER IA-ACNC: 1.18 INDEX (ref 0–0.9)
HSV2 IGG SERPL QL IA: NEGATIVE
N GONORRHOEA RRNA SPEC QL NAA+PROBE: NEGATIVE
RUBV IGG SERPL IA-ACNC: 1.84 INDEX
TRICHOMONAS VAGINALIS PCR: NEGATIVE

## 2022-04-14 ENCOUNTER — REFERRAL TRIAGE (OUTPATIENT)
Dept: LABOR AND DELIVERY | Facility: HOSPITAL | Age: 27
End: 2022-04-14

## 2022-04-19 ENCOUNTER — INITIAL PRENATAL (OUTPATIENT)
Dept: OBSTETRICS AND GYNECOLOGY | Facility: CLINIC | Age: 27
End: 2022-04-19

## 2022-04-19 VITALS — DIASTOLIC BLOOD PRESSURE: 62 MMHG | WEIGHT: 181 LBS | SYSTOLIC BLOOD PRESSURE: 108 MMHG | BODY MASS INDEX: 30.12 KG/M2

## 2022-04-19 DIAGNOSIS — N39.0 URINARY TRACT INFECTION IN FEMALE: ICD-10-CM

## 2022-04-19 DIAGNOSIS — Z34.80 SUPERVISION OF OTHER NORMAL PREGNANCY, ANTEPARTUM: Primary | ICD-10-CM

## 2022-04-19 DIAGNOSIS — K59.09 CHRONIC CONSTIPATION: ICD-10-CM

## 2022-04-19 DIAGNOSIS — Z86.19 HISTORY OF HERPES GENITALIS: ICD-10-CM

## 2022-04-19 PROCEDURE — 99214 OFFICE O/P EST MOD 30 MIN: CPT | Performed by: STUDENT IN AN ORGANIZED HEALTH CARE EDUCATION/TRAINING PROGRAM

## 2022-04-19 NOTE — PROGRESS NOTES
[facility]  Obstetrics Visit    CHIEF COMPLAINT:  New prenatal visit    HISTORY OF PRESENT ILLNESS:  Sandra Botello is a 27 y.o. y/o  at 8w0d by LMP (Patient's last menstrual period was 2022.) consistent with US today.  This was a unplanned pregnancy and the patient is supported by her .  Reports some nausea without vomiting, controlling with diet.  Reports breast tenderness.  She denies any vaginal bleeding.  She has started taking a prenatal vitamin. Has internal hemorrhoids and uses stool softener and hydrocortisone cream prn.    REVIEW OF SYSTEMS  Review of Systems   Constitutional: Negative.    HENT: Negative.    Respiratory: Negative.    Cardiovascular: Negative.    Gastrointestinal: Positive for nausea. Negative for vomiting.   Genitourinary: Negative for pelvic pain, pelvic pressure and vaginal bleeding.   Musculoskeletal: Negative.    Psychiatric/Behavioral: Negative.        PRENATAL RISK FACTORS   Problems (from 24 to present)     No problems associated with this episode.          DATING CRITERIA:  LMP (22) -- HEAVENLY 22  1TUS (22 at 8w3d) -- HEAVENLY 22    OBSTETRIC HISTORY:  OB History    Para Term  AB Living   2 0 0 0 1 0   SAB IAB Ectopic Molar Multiple Live Births   1 0 0 0 0 0      # Outcome Date GA Lbr Lux/2nd Weight Sex Delivery Anes PTL Lv   2 Current            1 SAB 2021             GYN HISTORY:  Denies h/o sexually transmitted infections/pelvic inflammatory disease  Denies h/o abnormal pap smears  Last pap smear:   Last Completed Pap Smear          PAP SMEAR (Every 3 Years) Next due on 9/10/2024    09/10/2021  Liquid-based Pap Smear, Screening    2018  Liquid-based Pap Smear, Screening              Denies h/o gynecologic surgeries, including biopsies of the cervix    PAST MEDICAL HISTORY:  Past Medical History:   Diagnosis Date   • Herpes      PAST SURGICAL HISTORY:  No past surgical history on  file.  FAMILY HISTORY:  Family History   Problem Relation Age of Onset   • Melanoma Mother    • Hypertension Father    • No Known Problems Sister    • No Known Problems Brother    • No Known Problems Maternal Grandmother    • Cancer Maternal Grandfather    • Thyroid disease Maternal Grandfather    • Hypertension Paternal Grandmother    • Diabetes Paternal Grandmother    • Depression Paternal Grandmother      SOCIAL HISTORY:  Social History     Socioeconomic History   • Marital status: Single   Tobacco Use   • Smoking status: Never Smoker   • Smokeless tobacco: Never Used   Vaping Use   • Vaping Use: Never used   Substance and Sexual Activity   • Alcohol use: No   • Drug use: No   • Sexual activity: Yes     Partners: Male     Birth control/protection: None     Comment: last pap smear negative on 9/10/21     GENETIC SCREENING:  Age >36 yo as of HEAVENLY: no  Thalassemia: no  NTD: no  CHD: no  Down Syndrome/MR/Fragile X/Autism: no  Ashkenazi Hoahaoism with Crow-Sachs, [place], familial dysautonomia: no  Sickle cell disease or trait: no  Hemophilia: no  Muscular dystrophy: no  Cystic fibrosis: no  Arenac's chorea: no  Birth defects: no  Genetic/chromosomal disorders: no    INFECTION HISTORY:  TB exposure: no  HSV: yes-see below  Illness since LMP: no  Prior GBS infected child: no  STIs: h/o HSV    ALLERGIES:  No Known Allergies    MEDICATIONS:  Prior to Admission medications    Medication Sig Start Date End Date Taking? Authorizing Provider   docusate sodium (Colace) 100 MG capsule Take 1 capsule by mouth 2 (Two) Times a Day. 3/8/22  Yes Zaida Farah APRN   hydrocortisone (ANUSOL-HC) 25 MG suppository Insert 1 suppository into the rectum 2 (Two) Times a Day. 3/29/22  Yes Zaida Farah APRN   Hydrocortisone, Perianal, (ANUSOL-HC) 2.5 % rectal cream Insert  into the rectum 2 (Two) Times a Day. 3/29/22  Yes Zaida Farah APRN   prenatal vitamin (prenatal, CLASSIC, vitamin) tablet Take  by mouth Daily.   Yes Provider,  Historical, MD       PHYSICAL EXAM:   /62   Wt 82.1 kg (181 lb)   LMP 2022   BMI 30.12 kg/m²   General: Alert, healthy, no distress, well nourished and well developed.  Neurologic: Alert, oriented to person, place, and time.  Gait normal.  Cranial nerves II-XII grossly intact.  HEENT: Normocephalic, atraumatic.  Extraocular muscles intact, pupils equal and reactive x2.    Teeth: Normal hygiene.  Neck: Supple, no adenopathy, thyroid normal size, non-tender, without nodularity, trachea midline.  Breasts: No masses, skin dimpling, skin retraction, nipple discharge, or asymmetry bilaterally.  Lungs: Normal respiratory effort.  Clear to auscultation bilaterally.  No wheezes, rhonci, or rales.  Heart: Regular rate and rhythm.  No murmer, rub or gallop.  Abdomen: Soft, non-tender, non-distended,no masses, no hepatosplenomegaly, no hernia.  Skin: No rash, no lesions.  Extremities: No cyanosis, clubbing or edema.  PELVIC EXAM:  External Genitalia/Vulva: Anatomy is normal, no significant redness of labia, no discharge on vulvar tissues, [place]'s and [place]th[place]'s glands are normal, no ulcers, no condylomatous lesions.  Urethra: Normal, no lesions.  Vagina: Vaginal tissues are not inflamed, normal color and texture, no significant discharge present.  Cervix: Normal in appearance without lesions or purulent discharge, no cervical motion tenderness.  Uterus: Normal size, shape, and consistency.  Adnexa: Normal size and shape bilaterally, no palpable mass bilaterally and non-tender bilaterally.    Preliminary US:  bpm, GA 8+0 with HEAVENLY 22 by L=8, single IUP    IMPRESSION:  Sandra Botello is a 27 y.o.  at 8w0d for a new prenatal visit.    PLAN:  1.  IUP at 8w0d  - Options counseling performed and patient desires continuation of pregnancy to term   - Prenatal labs reviewed  - Genetic testing, including cystic fibrosis, was discussed and patient desires cfDNA, will perform after 10 weeks  -  Continue prenatal vitamins  - Weight gain counseling performed.   - Pregravid BMI 25-29.9: Recommend 15-25 lb  - Return to clinic in 4 weeks for return prenatal visit  - Reviewed COVID-19 visitation policy  - Reviewed COVID-19 precautions    No diagnosis found.  Edith Olivo DO  4/19/2022  15:56 CDT

## 2022-04-24 PROBLEM — Z86.19 HISTORY OF HERPES GENITALIS: Status: ACTIVE | Noted: 2022-04-24

## 2022-04-24 PROBLEM — Z34.80 SUPERVISION OF OTHER NORMAL PREGNANCY, ANTEPARTUM: Status: ACTIVE | Noted: 2022-04-24

## 2022-04-24 PROBLEM — K59.09 CHRONIC CONSTIPATION: Status: ACTIVE | Noted: 2022-04-24

## 2022-04-29 ENCOUNTER — PATIENT ROUNDING (BHMG ONLY) (OUTPATIENT)
Dept: OBSTETRICS AND GYNECOLOGY | Facility: CLINIC | Age: 27
End: 2022-04-29

## 2022-04-29 NOTE — PROGRESS NOTES
April 29, 2022    Hello, may I speak with Sandra Botello?    My name is Linda Arevalo     I am  with [facility]  [facility]  [facility] [address]  [place] [address] [phone].    Before we get started may I verify your date of birth? 1995    I am calling to officially welcome you to our practice and ask about your recent visit. Is this a good time to talk? Yes    Tell me about your visit with us. What things went well?  Patient said the visit went very well. Patient stated we made her feel very comfortable, and listened and answered all her questions.       We're always looking for ways to make our patients' experiences even better. Do you have recommendations on ways we may improve? No    Overall were you satisfied with your first visit to our practice?   Yes     I appreciate you taking the time to speak with me today. Is there anything else I can do for you? No      Thank you, and have a great day.

## 2022-05-05 ENCOUNTER — LAB (OUTPATIENT)
Dept: LAB | Facility: HOSPITAL | Age: 27
End: 2022-05-05

## 2022-05-05 DIAGNOSIS — Z34.90 ENCOUNTER FOR SUPERVISION OF NORMAL PREGNANCY, ANTEPARTUM, UNSPECIFIED GRAVIDITY: Primary | ICD-10-CM

## 2022-05-05 DIAGNOSIS — Z34.90 ENCOUNTER FOR SUPERVISION OF NORMAL PREGNANCY, ANTEPARTUM, UNSPECIFIED GRAVIDITY: ICD-10-CM

## 2022-05-17 ENCOUNTER — ROUTINE PRENATAL (OUTPATIENT)
Dept: OBSTETRICS AND GYNECOLOGY | Facility: CLINIC | Age: 27
End: 2022-05-17

## 2022-05-17 VITALS — SYSTOLIC BLOOD PRESSURE: 138 MMHG | DIASTOLIC BLOOD PRESSURE: 70 MMHG | WEIGHT: 182 LBS | BODY MASS INDEX: 30.29 KG/M2

## 2022-05-17 DIAGNOSIS — Z86.19 HISTORY OF HERPES GENITALIS: ICD-10-CM

## 2022-05-17 DIAGNOSIS — K59.09 CHRONIC CONSTIPATION: ICD-10-CM

## 2022-05-17 DIAGNOSIS — Z34.80 SUPERVISION OF OTHER NORMAL PREGNANCY, ANTEPARTUM: Primary | ICD-10-CM

## 2022-05-17 PROCEDURE — 99213 OFFICE O/P EST LOW 20 MIN: CPT | Performed by: STUDENT IN AN ORGANIZED HEALTH CARE EDUCATION/TRAINING PROGRAM

## 2022-05-17 NOTE — PROGRESS NOTES
CC: Prenatal visit    Sandra Botello is a 27 y.o.  at 12w0d.  Doing well.  Denies contractions, LOF, or VB.  Not yet with FM.    Hemorrhoids-stable, using foam/cream, will trial stool softeners to ensure stools staying soft  Nausea without vomiting-now resolved    /70   Wt 82.6 kg (182 lb)   LMP 2022   BMI 30.29 kg/m²   SVE: deferred     Fetal Heart Rate: +vscan +cardiac activity and fetal movement    A/P: Sandra Botello is a 27 y.o.  at 12w0d.  - RTC in 4 weeks  - Reviewed COVID-19 visitation policy  - Reviewed COVID-19 precautions    Problem List Items Addressed This Visit        Gastrointestinal Abdominal     Chronic constipation    Overview     Chronic constipation, internal hemorrhoids prior to pregnancy  Continue stool softener prn, may need to add Miralax              Gravid and     Supervision of other normal pregnancy, antepartum - Primary    Overview     H/o spontaneous sab x1, early  Rh+  RI  IOB Hgb 13.1, Plt 206  Desires cfDNA, ordered for after 10 week  HSV-needs suppression 35-36 weeks              Other    History of herpes genitalis    Overview     Plan for suppression at 35-36 weeks                    Diagnosis Plan   1. Supervision of other normal pregnancy, antepartum     2. History of herpes genitalis     3. Chronic constipation       Edith Olivo DO  2022  21:23 CDT

## 2022-06-02 ENCOUNTER — PATIENT OUTREACH (OUTPATIENT)
Dept: LABOR AND DELIVERY | Facility: HOSPITAL | Age: 27
End: 2022-06-02

## 2022-06-02 NOTE — OUTREACH NOTE
[facility]  Enrollment    Current Estimated Gestational Age: 14w2d    Questions/Answers    Flowsheet Row Responses   Would like to participate? Yes   Date of Intake Visit 06/21/22        Patient coming in person on 6/21at 0900 after prenatal visit.    Eun Sanches RN  Maternity Nurse Navigator    6/2/2022, 09:59 CDT

## 2022-06-16 ENCOUNTER — PATIENT OUTREACH (OUTPATIENT)
Dept: LABOR AND DELIVERY | Facility: HOSPITAL | Age: 27
End: 2022-06-16

## 2022-06-16 DIAGNOSIS — K59.09 CHRONIC CONSTIPATION: ICD-10-CM

## 2022-06-16 DIAGNOSIS — Z34.80 SUPERVISION OF OTHER NORMAL PREGNANCY, ANTEPARTUM: ICD-10-CM

## 2022-06-16 DIAGNOSIS — Z86.19 HISTORY OF HERPES GENITALIS: Primary | ICD-10-CM

## 2022-06-16 NOTE — OUTREACH NOTE
[facility]  Intake    Current Estimated Gestational Age: 16w2d    Questions/Answers    Flowsheet Row Responses   Best Method for Contacting Cell   Do you have a dentist? Yes   Dentist Name Dr. Laughlin   Have you seen a dentist in the last 6 months Yes   Resources Presently Utilizing: None   Maternal Warning Signs Provided              Eun Sanches, RN  Maternity Nurse Navigator    6/16/2022, 09:38 CDT

## 2022-06-21 ENCOUNTER — ROUTINE PRENATAL (OUTPATIENT)
Dept: OBSTETRICS AND GYNECOLOGY | Facility: CLINIC | Age: 27
End: 2022-06-21

## 2022-06-21 VITALS — BODY MASS INDEX: 31.38 KG/M2 | SYSTOLIC BLOOD PRESSURE: 126 MMHG | WEIGHT: 188.6 LBS | DIASTOLIC BLOOD PRESSURE: 78 MMHG

## 2022-06-21 DIAGNOSIS — N39.0 URINARY TRACT INFECTION IN FEMALE: ICD-10-CM

## 2022-06-21 DIAGNOSIS — U07.1 COVID-19 VIRUS DETECTED: ICD-10-CM

## 2022-06-21 DIAGNOSIS — K59.09 CHRONIC CONSTIPATION: ICD-10-CM

## 2022-06-21 DIAGNOSIS — Z34.80 SUPERVISION OF OTHER NORMAL PREGNANCY, ANTEPARTUM: Primary | ICD-10-CM

## 2022-06-21 DIAGNOSIS — Z86.19 HISTORY OF HERPES GENITALIS: ICD-10-CM

## 2022-06-21 PROCEDURE — 99214 OFFICE O/P EST MOD 30 MIN: CPT | Performed by: STUDENT IN AN ORGANIZED HEALTH CARE EDUCATION/TRAINING PROGRAM

## 2022-07-12 ENCOUNTER — PATIENT OUTREACH (OUTPATIENT)
Dept: OBSTETRICS AND GYNECOLOGY | Facility: HOSPITAL | Age: 27
End: 2022-07-12

## 2022-07-12 NOTE — OUTREACH NOTE
"[facility]  Check-In    Current Estimated Gestational Age: 20w0d    Questions/Answers    Flowsheet Row Responses   Best Method for Contacting Cell   Demographics Reviewed No   Currently Employed Yes   Able to keep appointments as scheduled Yes   Gender(s) and Name(s) Boy \"Crew\"   Baby Active/Feeling Fetal Movemen Yes  [started feeling movement two weeks ago]   Questions regarding prenatal visits or tests to be ordered? No   Education related to new diagnoses/home equipment No   May I ask you questions about your substance use? Yes   Supplies ready for baby Car Seat, Clothing, Crib, Diapers, Feeding Supplies   Resource/Environmental Concerns None   Do you have any questions related to your care experience, your pregnancy, plans for delivery, any concerns, etc? No          Sandra is doing well and feeling well.  We discussed resources available to her specifically WIC, HANDS, insurance benefits and Physical Therapy with [facility].  She denied questions, concerns or needs at this time.    Eun Sanches RN  Maternity Nurse Navigator    7/12/2022, 08:54 CDT        "

## 2022-07-18 ENCOUNTER — ROUTINE PRENATAL (OUTPATIENT)
Dept: OBSTETRICS AND GYNECOLOGY | Facility: CLINIC | Age: 27
End: 2022-07-18

## 2022-07-18 DIAGNOSIS — Z34.80 SUPERVISION OF OTHER NORMAL PREGNANCY, ANTEPARTUM: Primary | ICD-10-CM

## 2022-07-18 DIAGNOSIS — Z86.19 HISTORY OF HERPES GENITALIS: ICD-10-CM

## 2022-07-18 DIAGNOSIS — K59.09 CHRONIC CONSTIPATION: ICD-10-CM

## 2022-07-18 DIAGNOSIS — U07.1 COVID-19 VIRUS DETECTED: ICD-10-CM

## 2022-07-18 PROCEDURE — 99213 OFFICE O/P EST LOW 20 MIN: CPT | Performed by: STUDENT IN AN ORGANIZED HEALTH CARE EDUCATION/TRAINING PROGRAM

## 2022-07-18 NOTE — PROGRESS NOTES
CC: Prenatal visit    Sandra Botello is a 27 y.o.  at 20w6d.  Doing well.  Denies contractions, LOF, or VB.  Reports good FM.    Constipation is stable, uses stool softener and MiraLAX as needed.  Denies any reflux.    /70   Wt 86.8 kg (191 lb 6.4 oz)   LMP 2022   BMI 31.85 kg/m²   SVE: Deferred     Fetal Heart Rate: 154 us     Preliminary ultrasound: Breech fetus, fetal heart rate 154 bpm, posterior fundal placenta, ALBINO within normal limits, BPD 47.6%, HC 36.9%, AC 58.9%, FL 62.2%, all anatomy seen and normal, cervical length 4.21 cm, boy    A/P: Sandra Botello is a 27 y.o.  at 20w6d.  - RTC in 4 weeks  - Reviewed COVID-19 visitation policy  - Reviewed COVID-19 precautions    Problem List Items Addressed This Visit        Gastrointestinal Abdominal     Chronic constipation    Overview     Chronic constipation, internal hemorrhoids prior to pregnancy  Continue stool softener prn, may need to add Miralax              Gravid and     Supervision of other normal pregnancy, antepartum    Overview     H/o spontaneous sab x1, early  Rh+  RI  IOB Hgb 13.1, Plt 206  Desires cfDNA, ordered for after 10 week  HSV-needs suppression 35-36 weeks              Other    History of herpes genitalis - Primary    Overview     Plan for suppression at 35-36 weeks           COVID-19 virus detected    Overview     Status post COVID in early 2nd trimester  Consider BPPs, serial growth US in pregnancy                    Diagnosis Plan   1. History of herpes genitalis     2. Chronic constipation     3. Supervision of other normal pregnancy, antepartum     4. COVID-19 virus detected       Edith Olivo DO  2022  17:06 CDT

## 2022-07-24 VITALS — DIASTOLIC BLOOD PRESSURE: 70 MMHG | SYSTOLIC BLOOD PRESSURE: 108 MMHG | BODY MASS INDEX: 31.85 KG/M2 | WEIGHT: 191.4 LBS

## 2022-07-24 PROBLEM — O23.40 URINARY TRACT INFECTION IN MOTHER DURING PREGNANCY, ANTEPARTUM: Status: ACTIVE | Noted: 2022-07-24

## 2022-07-24 PROBLEM — O23.40 URINARY TRACT INFECTION IN MOTHER DURING PREGNANCY, ANTEPARTUM: Status: RESOLVED | Noted: 2022-07-24 | Resolved: 2022-07-24

## 2022-08-04 ENCOUNTER — TELEPHONE (OUTPATIENT)
Dept: OBSTETRICS AND GYNECOLOGY | Facility: CLINIC | Age: 27
End: 2022-08-04

## 2022-08-04 NOTE — TELEPHONE ENCOUNTER
I called patient to discuss the prenatal education classes with her. She is interested in the classes and will think more about them before signing up.

## 2022-08-16 ENCOUNTER — ROUTINE PRENATAL (OUTPATIENT)
Dept: OBSTETRICS AND GYNECOLOGY | Facility: CLINIC | Age: 27
End: 2022-08-16

## 2022-08-16 VITALS — BODY MASS INDEX: 33.38 KG/M2 | SYSTOLIC BLOOD PRESSURE: 120 MMHG | WEIGHT: 200.6 LBS | DIASTOLIC BLOOD PRESSURE: 70 MMHG

## 2022-08-16 DIAGNOSIS — Z34.80 SUPERVISION OF OTHER NORMAL PREGNANCY, ANTEPARTUM: ICD-10-CM

## 2022-08-16 DIAGNOSIS — Z3A.25 25 WEEKS GESTATION OF PREGNANCY: Primary | ICD-10-CM

## 2022-08-16 DIAGNOSIS — Z36.89 ENCOUNTER FOR OTHER SPECIFIED ANTENATAL SCREENING: ICD-10-CM

## 2022-08-16 DIAGNOSIS — Z86.19 HISTORY OF HERPES GENITALIS: ICD-10-CM

## 2022-08-16 DIAGNOSIS — K59.09 CHRONIC CONSTIPATION: ICD-10-CM

## 2022-08-16 DIAGNOSIS — U07.1 COVID-19 VIRUS DETECTED: ICD-10-CM

## 2022-08-16 PROCEDURE — 99213 OFFICE O/P EST LOW 20 MIN: CPT | Performed by: NURSE PRACTITIONER

## 2022-08-16 NOTE — PROGRESS NOTES
CC: Prenatal visit    Sandra Botello is a 27 y.o.  at 25w0d.  Doing well.  Denies contractions, LOF, or VB, headaches.  Reports good FM. Initial B/P of 140/70; repeat 120/70. Reports that she is initially nervous at all her appts. Denies hx of CHTN.     /70   Wt 91 kg (200 lb 9.6 oz)   LMP 2022   BMI 33.38 kg/m²   SVE: Deferred  Fundal Height (cm): 25 cm  Fetal Heart Rate: 150     Problems (from 22 to present)     Problem Noted Resolved    COVID-19 virus detected 2022 by Edith Olivo DO No    Overview Signed 2022  8:42 AM by Edith Olivo DO     Status post COVID in early 2nd trimester  Consider BPPs, serial growth US in pregnancy         History of herpes genitalis 2022 by Edith Olivo DO No    Overview Signed 2022  5:23 PM by Edith Olivo DO     Plan for suppression at 35-36 weeks         Chronic constipation 2022 by Edith Olivo DO No    Overview Signed 2022  5:25 PM by Edith Olivo DO     Chronic constipation, internal hemorrhoids prior to pregnancy  Continue stool softener prn, may need to add Miralax         Supervision of other normal pregnancy, antepartum 2022 by Edith Olivo DO No    Overview Addendum 2022  5:10 PM by Edith Olivo DO     H/o spontaneous sab x1, early  Rh+  RI  IOB Hgb 13.1, Plt 206  NIPS: Low risk male  HSV-needs suppression 35-36 weeks         Previous Version    Urinary tract infection in mother during pregnancy, antepartum 2022 by Edith Olivo, DO 2022 by Edith Olivo DO    Overview Signed 2022  5:08 PM by Edith Olivo DO     Repeat urine culture negative               A/P: Sandra Botello is a 27 y.o.  at 25w0d.  - Discussed continuing to monitor B/P at visits; low suspicion for GHTN at this time.  - 3T labs next visit, instructions given  - Counseled on TDAP vaccine, pt is interested in receiving at next visit.   - RTC in 3 weeks  - Reviewed COVID-19 visitation  policy  - Reviewed COVID-19 precautions     Diagnosis Plan   1. 25 weeks gestation of pregnancy     2. COVID-19 virus detected     3. History of herpes genitalis     4. Chronic constipation     5. Supervision of other normal pregnancy, antepartum     6. Encounter for other specified  screening  Glucose, Post 50 Gm Glucola    CBC (No Diff)     Yady Neville, APRN  2022  13:01 CDT

## 2022-09-06 ENCOUNTER — LAB (OUTPATIENT)
Dept: LAB | Facility: HOSPITAL | Age: 27
End: 2022-09-06

## 2022-09-06 ENCOUNTER — ROUTINE PRENATAL (OUTPATIENT)
Dept: OBSTETRICS AND GYNECOLOGY | Facility: CLINIC | Age: 27
End: 2022-09-06

## 2022-09-06 ENCOUNTER — PATIENT OUTREACH (OUTPATIENT)
Dept: LABOR AND DELIVERY | Facility: HOSPITAL | Age: 27
End: 2022-09-06

## 2022-09-06 VITALS — BODY MASS INDEX: 34.08 KG/M2 | DIASTOLIC BLOOD PRESSURE: 60 MMHG | SYSTOLIC BLOOD PRESSURE: 120 MMHG | WEIGHT: 204.8 LBS

## 2022-09-06 DIAGNOSIS — Z34.80 SUPERVISION OF OTHER NORMAL PREGNANCY, ANTEPARTUM: ICD-10-CM

## 2022-09-06 DIAGNOSIS — Z86.19 HISTORY OF HERPES GENITALIS: ICD-10-CM

## 2022-09-06 DIAGNOSIS — Z23 NEED FOR TDAP VACCINATION: ICD-10-CM

## 2022-09-06 DIAGNOSIS — Z36.89 ENCOUNTER FOR OTHER SPECIFIED ANTENATAL SCREENING: ICD-10-CM

## 2022-09-06 DIAGNOSIS — U07.1 COVID-19 VIRUS DETECTED: Primary | ICD-10-CM

## 2022-09-06 DIAGNOSIS — K59.09 CHRONIC CONSTIPATION: ICD-10-CM

## 2022-09-06 PROBLEM — R03.0 ELEVATED BLOOD PRESSURE READING WITHOUT DIAGNOSIS OF HYPERTENSION: Status: ACTIVE | Noted: 2022-09-06

## 2022-09-06 LAB
DEPRECATED RDW RBC AUTO: 38.3 FL (ref 37–54)
ERYTHROCYTE [DISTWIDTH] IN BLOOD BY AUTOMATED COUNT: 12.2 % (ref 12.3–15.4)
GLUCOSE 1H P 100 G GLC PO SERPL-MCNC: 114 MG/DL (ref 65–139)
HCT VFR BLD AUTO: 32.9 % (ref 34–46.6)
HGB BLD-MCNC: 11.6 G/DL (ref 12–15.9)
MCH RBC QN AUTO: 30.9 PG (ref 26.6–33)
MCHC RBC AUTO-ENTMCNC: 35.3 G/DL (ref 31.5–35.7)
MCV RBC AUTO: 87.7 FL (ref 79–97)
PLATELET # BLD AUTO: 163 10*3/MM3 (ref 140–450)
PMV BLD AUTO: 11.2 FL (ref 6–12)
RBC # BLD AUTO: 3.75 10*6/MM3 (ref 3.77–5.28)
WBC NRBC COR # BLD: 9.92 10*3/MM3 (ref 3.4–10.8)

## 2022-09-06 PROCEDURE — 90471 IMMUNIZATION ADMIN: CPT | Performed by: STUDENT IN AN ORGANIZED HEALTH CARE EDUCATION/TRAINING PROGRAM

## 2022-09-06 PROCEDURE — 36415 COLL VENOUS BLD VENIPUNCTURE: CPT

## 2022-09-06 PROCEDURE — 82950 GLUCOSE TEST: CPT

## 2022-09-06 PROCEDURE — 99214 OFFICE O/P EST MOD 30 MIN: CPT | Performed by: STUDENT IN AN ORGANIZED HEALTH CARE EDUCATION/TRAINING PROGRAM

## 2022-09-06 PROCEDURE — 85027 COMPLETE CBC AUTOMATED: CPT

## 2022-09-06 PROCEDURE — 90715 TDAP VACCINE 7 YRS/> IM: CPT | Performed by: STUDENT IN AN ORGANIZED HEALTH CARE EDUCATION/TRAINING PROGRAM

## 2022-09-06 NOTE — OUTREACH NOTE
"[facility]  Check-In    Current Estimated Gestational Age: 28w0d    Questions/Answers    Flowsheet Row Responses   Best Method for Contacting Cell   Demographics Reviewed Yes   Currently Employed Yes   Able to keep appointments as scheduled Yes   Gender(s) and Name(s) Boy- \"Crew\"   Baby Active/Feeling Fetal Movemen Yes   How are you presently feeling? Doing pretty great, he's super active all the time.   Questions regarding prenatal visits or tests to be ordered? No   Education related to new diagnoses/home equipment No   May I ask you questions about your substance use? Yes   Supplies ready for baby Car Seat, Clothing, Crib, Diapers, Feeding Supplies   Resource/Environmental Concerns None   Do you have any questions related to your care experience, your pregnancy, plans for delivery, any concerns, etc? No          I spoke to Sandra this morning as she was going in to her 28 week doctor's appointment.  She denied any questions, concerns, or needs at this time.  She'll be having her glucose test this morning.  Will send [employer] message with options for next check in and unit tour dates.    Eun Sanches RN  Maternity Nurse Navigator    9/6/2022, 08:11 CDT        "

## 2022-09-21 DIAGNOSIS — O26.849 UTERINE SIZE DATE DISCREPANCY PREGNANCY: Primary | ICD-10-CM

## 2022-09-22 ENCOUNTER — ROUTINE PRENATAL (OUTPATIENT)
Dept: OBSTETRICS AND GYNECOLOGY | Facility: CLINIC | Age: 27
End: 2022-09-22

## 2022-09-22 VITALS — BODY MASS INDEX: 34.11 KG/M2 | WEIGHT: 205 LBS | DIASTOLIC BLOOD PRESSURE: 70 MMHG | SYSTOLIC BLOOD PRESSURE: 120 MMHG

## 2022-09-22 DIAGNOSIS — K59.09 CHRONIC CONSTIPATION: ICD-10-CM

## 2022-09-22 DIAGNOSIS — Z86.19 HISTORY OF HERPES GENITALIS: ICD-10-CM

## 2022-09-22 DIAGNOSIS — Z34.83 ENCOUNTER FOR SUPERVISION OF OTHER NORMAL PREGNANCY IN THIRD TRIMESTER: Primary | ICD-10-CM

## 2022-09-22 DIAGNOSIS — Z3A.30 30 WEEKS GESTATION OF PREGNANCY: ICD-10-CM

## 2022-09-22 DIAGNOSIS — R03.0 ELEVATED BLOOD PRESSURE READING WITHOUT DIAGNOSIS OF HYPERTENSION: ICD-10-CM

## 2022-09-22 PROBLEM — N39.0 URINARY TRACT INFECTION IN FEMALE: Status: RESOLVED | Noted: 2020-09-30 | Resolved: 2022-09-22

## 2022-09-22 PROCEDURE — 99213 OFFICE O/P EST LOW 20 MIN: CPT | Performed by: NURSE PRACTITIONER

## 2022-10-04 ENCOUNTER — ROUTINE PRENATAL (OUTPATIENT)
Dept: OBSTETRICS AND GYNECOLOGY | Facility: CLINIC | Age: 27
End: 2022-10-04

## 2022-10-04 VITALS — DIASTOLIC BLOOD PRESSURE: 70 MMHG | BODY MASS INDEX: 33.95 KG/M2 | SYSTOLIC BLOOD PRESSURE: 104 MMHG | WEIGHT: 204 LBS

## 2022-10-04 DIAGNOSIS — U07.1 COVID-19 VIRUS DETECTED: Primary | ICD-10-CM

## 2022-10-04 DIAGNOSIS — Z34.80 SUPERVISION OF OTHER NORMAL PREGNANCY, ANTEPARTUM: ICD-10-CM

## 2022-10-04 DIAGNOSIS — Z86.19 HISTORY OF HERPES GENITALIS: ICD-10-CM

## 2022-10-04 DIAGNOSIS — R03.0 ELEVATED BLOOD PRESSURE READING WITHOUT DIAGNOSIS OF HYPERTENSION: ICD-10-CM

## 2022-10-04 DIAGNOSIS — K59.09 CHRONIC CONSTIPATION: ICD-10-CM

## 2022-10-04 DIAGNOSIS — Z3A.32 32 WEEKS GESTATION OF PREGNANCY: ICD-10-CM

## 2022-10-04 PROCEDURE — 99213 OFFICE O/P EST LOW 20 MIN: CPT | Performed by: NURSE PRACTITIONER

## 2022-10-04 NOTE — PROGRESS NOTES
CC: Prenatal visit    Sandra Botello is a 27 y.o.  at 32w0d.  Doing well.  Denies contractions, LOF, or VB.  Reports good FM.    /70   Wt 92.5 kg (204 lb)   LMP 2022   BMI 33.95 kg/m²   SVE: Deferred  Fundal Height (cm): 33 cm  Fetal Heart Rate: 150       Problems (from 22 to present)     Problem Noted Resolved    Elevated blood pressure reading without diagnosis of hypertension 2022 by Edith Olivo DO No    COVID-19 virus detected 2022 by Edith Olivo DO No    Overview Addendum 10/4/2022  9:34 AM by Yady Wilson APRN     Status post COVID in early 2nd trimester. Mild symptoms, no hospitalization.   Consider BPPs, serial growth US in pregnancy    10/4: GS ordered. Defer BPPs at this time unless complications arise.          Previous Version    History of herpes genitalis 2022 by Edith Olivo DO No    Overview Signed 2022  5:23 PM by Edith Olivo DO     Plan for suppression at 35-36 weeks         Chronic constipation 2022 by Edith Olivo DO No    Overview Signed 2022  5:25 PM by Edith Olivo DO     Chronic constipation, internal hemorrhoids prior to pregnancy  Continue stool softener prn, may need to add Miralax         Supervision of other normal pregnancy, antepartum 2022 by Edith Olivo DO No    Overview Addendum 2022  5:10 PM by Edith Olivo DO     H/o spontaneous sab x1, early  Rh+  RI  IOB Hgb 13.1, Plt 206  NIPS: Low risk male  HSV-needs suppression 35-36 weeks         Previous Version    Urinary tract infection in mother during pregnancy, antepartum 2022 by Edith Olivo,  2022 by Edith Olivo DO    Overview Signed 2022  5:08 PM by Edith Olivo DO     Repeat urine culture negative               A/P: Sandra Botello is a 27 y.o.  at 32w0d.  - GS ordered at next appt. Will do serial GS scans secondary to hx of covid, defer BPP unless complications arise.  - Discussed pt's concern  about baby being breech for awhile. Discussed option for version, if needed closer to 37 week or later.   - RTC in 2 weeks     Diagnosis Plan   1. COVID-19 virus detected  US Ob Follow Up Transabdominal Approach   2. History of herpes genitalis     3. Chronic constipation     4. Supervision of other normal pregnancy, antepartum  US Ob Follow Up Transabdominal Approach   5. Elevated blood pressure reading without diagnosis of hypertension     6. 32 weeks gestation of pregnancy       Yady Neville, APRN  10/4/2022  09:41 CDT

## 2022-10-05 NOTE — PROGRESS NOTES
CC: Prenatal visit    Sandra Botello is a 27 y.o.  at 32w1d.  Doing well.  Denies dysuria, abnormal vaginal d/c, headaches, heartburn, cramping, regular contractions, LOF, or VB.  Reports good FM.    /70   Wt 93 kg (205 lb)   LMP 2022   BMI 34.11 kg/m²   SVE: NA    Growth scan reviewed. EFW- 1760g, 76%tile. AC- 85%tile.      Fetal Heart Rate: 141     Problems (from 22 to present)     Problem Noted Resolved    Elevated blood pressure reading without diagnosis of hypertension 2022 by Edith Olivo DO No    COVID-19 virus detected 2022 by Edith Olivo DO No    Overview Addendum 10/4/2022  9:34 AM by Yady Wilson APRN     Status post COVID in early 2nd trimester. Mild symptoms, no hospitalization.   Consider BPPs, serial growth US in pregnancy    10/4: GS ordered. Defer BPPs at this time unless complications arise.          Previous Version    History of herpes genitalis 2022 by Edith Olivo DO No    Overview Signed 2022  5:23 PM by Edith Olivo DO     Plan for suppression at 35-36 weeks         Chronic constipation 2022 by Edith Olivo DO No    Overview Signed 2022  5:25 PM by Edith Olivo DO     Chronic constipation, internal hemorrhoids prior to pregnancy  Continue stool softener prn, may need to add Miralax         Supervision of other normal pregnancy, antepartum 2022 by Edith Olivo DO No    Overview Addendum 2022  5:10 PM by Edith Olivo DO     H/o spontaneous sab x1, early  Rh+  RI  IOB Hgb 13.1, Plt 206  NIPS: Low risk male  HSV-needs suppression 35-36 weeks         Previous Version    Urinary tract infection in mother during pregnancy, antepartum 2022 by Edith Olivo,  2022 by Edith Olivo DO    Overview Signed 2022  5:08 PM by Edith Olivo DO     Repeat urine culture negative               A/P: Sandra Botello is a 27 y.o.  at 32w1d.  - RTC in 2 weeks  - Reviewed COVID-19  visitation policy  - Reviewed COVID-19 precautions     Diagnosis Plan   1. Encounter for supervision of other normal pregnancy in third trimester     2. History of herpes genitalis     3. Chronic constipation     4. Elevated blood pressure reading without diagnosis of hypertension     5. 30 weeks gestation of pregnancy       Elizabeth Robbins, JOSE  10/5/2022  12:59 CDT

## 2022-10-18 ENCOUNTER — PATIENT OUTREACH (OUTPATIENT)
Dept: LABOR AND DELIVERY | Facility: HOSPITAL | Age: 27
End: 2022-10-18

## 2022-10-18 ENCOUNTER — ROUTINE PRENATAL (OUTPATIENT)
Dept: OBSTETRICS AND GYNECOLOGY | Facility: CLINIC | Age: 27
End: 2022-10-18

## 2022-10-18 VITALS — WEIGHT: 210.8 LBS | BODY MASS INDEX: 35.08 KG/M2 | SYSTOLIC BLOOD PRESSURE: 122 MMHG | DIASTOLIC BLOOD PRESSURE: 78 MMHG

## 2022-10-18 DIAGNOSIS — Z86.19 HISTORY OF HERPES GENITALIS: ICD-10-CM

## 2022-10-18 DIAGNOSIS — Z34.80 SUPERVISION OF OTHER NORMAL PREGNANCY, ANTEPARTUM: Primary | ICD-10-CM

## 2022-10-18 DIAGNOSIS — R03.0 ELEVATED BLOOD PRESSURE READING WITHOUT DIAGNOSIS OF HYPERTENSION: ICD-10-CM

## 2022-10-18 DIAGNOSIS — K59.09 CHRONIC CONSTIPATION: ICD-10-CM

## 2022-10-18 DIAGNOSIS — U07.1 COVID-19 VIRUS DETECTED: ICD-10-CM

## 2022-10-18 PROCEDURE — 99214 OFFICE O/P EST MOD 30 MIN: CPT | Performed by: STUDENT IN AN ORGANIZED HEALTH CARE EDUCATION/TRAINING PROGRAM

## 2022-10-18 RX ORDER — VALACYCLOVIR HYDROCHLORIDE 500 MG/1
500 TABLET, FILM COATED ORAL 2 TIMES DAILY
Qty: 60 TABLET | Refills: 1 | Status: SHIPPED | OUTPATIENT
Start: 2022-10-18 | End: 2022-11-17

## 2022-10-18 NOTE — OUTREACH NOTE
[facility]    I called Sandra to attempt to arrange her unit tour after her prenatal visit today.  When I spoke to her she was waiting for her prenatal visit and said she would call me back.  I awaited a return call until approximately 2pm, but did not receive.  Will reach out and attempt to reschedule.    Eun Sanches RN  Maternity Nurse Navigator    10/18/2022, 13:56 CDT

## 2022-11-01 ENCOUNTER — ROUTINE PRENATAL (OUTPATIENT)
Dept: OBSTETRICS AND GYNECOLOGY | Facility: CLINIC | Age: 27
End: 2022-11-01

## 2022-11-01 VITALS — DIASTOLIC BLOOD PRESSURE: 80 MMHG | WEIGHT: 215 LBS | SYSTOLIC BLOOD PRESSURE: 138 MMHG | BODY MASS INDEX: 35.78 KG/M2

## 2022-11-01 DIAGNOSIS — Z3A.36 36 WEEKS GESTATION OF PREGNANCY: Primary | ICD-10-CM

## 2022-11-01 DIAGNOSIS — Z34.80 SUPERVISION OF OTHER NORMAL PREGNANCY, ANTEPARTUM: ICD-10-CM

## 2022-11-01 DIAGNOSIS — K59.09 CHRONIC CONSTIPATION: ICD-10-CM

## 2022-11-01 DIAGNOSIS — Z86.19 HISTORY OF HERPES GENITALIS: ICD-10-CM

## 2022-11-01 DIAGNOSIS — R03.0 ELEVATED BLOOD PRESSURE READING WITHOUT DIAGNOSIS OF HYPERTENSION: ICD-10-CM

## 2022-11-01 DIAGNOSIS — U07.1 COVID-19 VIRUS DETECTED: ICD-10-CM

## 2022-11-01 PROCEDURE — 99214 OFFICE O/P EST MOD 30 MIN: CPT | Performed by: STUDENT IN AN ORGANIZED HEALTH CARE EDUCATION/TRAINING PROGRAM

## 2022-11-01 PROCEDURE — 87653 STREP B DNA AMP PROBE: CPT | Performed by: STUDENT IN AN ORGANIZED HEALTH CARE EDUCATION/TRAINING PROGRAM

## 2022-11-01 NOTE — PROGRESS NOTES
CC: Prenatal visit    Sandra Botello is a 27 y.o.  at 36w0d.  Doing well.  Denies contractions, LOF, or VB.  Reports good FM.    Mucousy discharge. Cramping occasionally, improves with ambulation.  Planning bottlefeeding, OCP.    /80   Wt 97.5 kg (215 lb)   LMP 2022   BMI 35.78 kg/m²   SVE: deferred     Fetal Heart Rate: 159 us    Prelim US, BPP for h/o COVID in pregnancy: cephalic, posterior placenta, ALBINO 13.4 cm,  bpm, BPP /    A/P: Sandra Botello is a 27 y.o.  at 36w0d.  - RTC in 1 weeks  - 10/18 sent Valtrex for ppx  - Opted for weekly BPP with h/o COVID  - GBS today  - Reviewed COVID-19 visitation policy  - Reviewed COVID-19 precautions    Problem List Items Addressed This Visit        Cardiac and Vasculature    Elevated blood pressure reading without diagnosis of hypertension       Gastrointestinal Abdominal     Chronic constipation    Overview     Chronic constipation, internal hemorrhoids prior to pregnancy  Continue stool softener prn, may need to add Miralax            Gravid and     Supervision of other normal pregnancy, antepartum    Overview     H/o spontaneous sab x1, early  Rh+  RI  IOB Hgb 13.1, Plt 206  NIPS: Low risk male  HSV-needs suppression 35-36 weeks            Other    History of herpes genitalis    Overview     Plan for suppression at 35-36 weeks         COVID-19 virus detected - Primary    Overview     Status post COVID in early 2nd trimester. Mild symptoms, no hospitalization.   Consider BPPs, serial growth US in pregnancy    Opted for weekly BPP for monitoring, discussed r/b.        Other Visit Diagnoses     36 weeks gestation of pregnancy        Relevant Orders    Group B Strep (Molecular) - Swab, Vaginal/Rectum             Diagnosis Plan   1. COVID-19 virus detected        2. History of herpes genitalis        3. Chronic constipation        4. Supervision of other normal pregnancy, antepartum        5. Elevated blood pressure  reading without diagnosis of hypertension        6. 36 weeks gestation of pregnancy  Group B Strep (Molecular) - Swab, Vaginal/Rectum    Group B Strep (Molecular) - Swab, Vaginal/Rectum        Edith Olvio DO  11/1/2022  10:08 CDT

## 2022-11-02 ENCOUNTER — PATIENT OUTREACH (OUTPATIENT)
Dept: LABOR AND DELIVERY | Facility: HOSPITAL | Age: 27
End: 2022-11-02

## 2022-11-02 LAB — GROUP B STREP, DNA: NEGATIVE

## 2022-11-02 NOTE — OUTREACH NOTE
[facility]  Check-In    Current Estimated Gestational Age: 36w1d    Questions/Answers    Flowsheet Row Responses   Best Method for Contacting Cell   Demographics Reviewed Yes   Able to keep appointments as scheduled Yes   Gender(s) and Name(s) Crew Rashida   Baby Active/Feeling Fetal Movemen Yes   How are you presently feeling? doing well   Questions regarding prenatal visits or tests to be ordered? No   Education related to new diagnoses/home equipment No   May I ask you questions about your substance use? Yes   Other Comment denies use   Supplies ready for baby Car Seat, Clothing, Crib, Diapers, Feeding Supplies   Resource/Environmental Concerns None   Do you have any questions related to your care experience, your pregnancy, plans for delivery, any concerns, etc? No          Sandra is doing well, she has an elective IOL planned for 11/26.  Planned postpartum check in call.  She denies and questions, concerns, or needs today.    Eun Sanches RN  Maternity Nurse Navigator    11/2/2022, 13:00 CDT

## 2022-11-08 ENCOUNTER — ROUTINE PRENATAL (OUTPATIENT)
Dept: OBSTETRICS AND GYNECOLOGY | Facility: CLINIC | Age: 27
End: 2022-11-08

## 2022-11-08 VITALS — SYSTOLIC BLOOD PRESSURE: 134 MMHG | WEIGHT: 220 LBS | DIASTOLIC BLOOD PRESSURE: 80 MMHG | BODY MASS INDEX: 36.61 KG/M2

## 2022-11-08 DIAGNOSIS — Z34.80 SUPERVISION OF OTHER NORMAL PREGNANCY, ANTEPARTUM: ICD-10-CM

## 2022-11-08 DIAGNOSIS — K59.09 CHRONIC CONSTIPATION: ICD-10-CM

## 2022-11-08 DIAGNOSIS — Z86.19 HISTORY OF HERPES GENITALIS: ICD-10-CM

## 2022-11-08 DIAGNOSIS — Z3A.37 37 WEEKS GESTATION OF PREGNANCY: ICD-10-CM

## 2022-11-08 DIAGNOSIS — R03.0 ELEVATED BLOOD PRESSURE READING WITHOUT DIAGNOSIS OF HYPERTENSION: Primary | ICD-10-CM

## 2022-11-08 DIAGNOSIS — U07.1 COVID-19 VIRUS DETECTED: ICD-10-CM

## 2022-11-08 DIAGNOSIS — Z23 INFLUENZA VACCINE ADMINISTERED: ICD-10-CM

## 2022-11-08 PROCEDURE — 99213 OFFICE O/P EST LOW 20 MIN: CPT | Performed by: NURSE PRACTITIONER

## 2022-11-08 PROCEDURE — 90686 IIV4 VACC NO PRSV 0.5 ML IM: CPT | Performed by: NURSE PRACTITIONER

## 2022-11-08 PROCEDURE — 90471 IMMUNIZATION ADMIN: CPT | Performed by: NURSE PRACTITIONER

## 2022-11-08 NOTE — PROGRESS NOTES
CC: Prenatal visit    Sandra Botello is a 27 y.o.  at 37w0d.  Doing well.  Denies dysuria, abnormal vaginal d/c, headaches, heartburn, cramping, regular contractions, LOF, or VB.  Reports good FM. She is taking her Valtrex    /80   Wt 99.8 kg (220 lb)   LMP 2022   BMI 36.61 kg/m²   SVE: NA     Fetal Heart Rate: 143usPrelim US: Cephalic, ALBINO: 10.60cm, , placenta posterior, BPP  Problems (from 22 to present)     Problem Noted Resolved    Elevated blood pressure reading without diagnosis of hypertension 2022 by Edith Olivo DO No    COVID-19 virus detected 2022 by Edith Olivo DO No    Overview Addendum 2022 10:08 AM by Edith Olivo DO     Status post COVID in early 2nd trimester. Mild symptoms, no hospitalization.   Consider BPPs, serial growth US in pregnancy    Opted for weekly BPP for monitoring, discussed r/b.         History of herpes genitalis 2022 by Edith Olivo DO No    Overview Signed 2022  5:23 PM by Edith Olivo DO     Plan for suppression at 35-36 weeks         Chronic constipation 2022 by Edith Olivo DO No    Overview Signed 2022  5:25 PM by Edith Olivo DO     Chronic constipation, internal hemorrhoids prior to pregnancy  Continue stool softener prn, may need to add Miralax         Supervision of other normal pregnancy, antepartum 2022 by Edith Olivo DO No    Overview Addendum 2022  5:10 PM by Edith Olivo DO     H/o spontaneous sab x1, early  Rh+  RI  IOB Hgb 13.1, Plt 206  NIPS: Low risk male  HSV-needs suppression 35-36 weeks         Urinary tract infection in mother during pregnancy, antepartum 2022 by Edith Olivo,  2022 by Edith Olivo DO    Overview Signed 2022  5:08 PM by Edith Olivo DO     Repeat urine culture negative               A/P: Sandra Botello is a 27 y.o.  at 37w0d.  - RTC in 1 weeks BPP h/o covid   - Flu shot today  - Reviewed  COVID-19 visitation policy  - Reviewed COVID-19 precautions     Diagnosis Plan   1. Elevated blood pressure reading without diagnosis of hypertension  US Fetal Biophysical Profile;Without Non-Stress Testing      2. COVID-19 virus detected        3. History of herpes genitalis        4. Chronic constipation        5. Supervision of other normal pregnancy, antepartum        6. 37 weeks gestation of pregnancy        7. Influenza vaccine administered  FluLaval/Fluzone >6 mos ([id number])        Scribed for JOSE Eaton by JOSE Roth. 11/8/2022  09:02 CST     IElizabeth APRN, personally performed the services described in this documentation as scribed by the above named individual in my presence, and it is both accurate and complete.  11/8/2022  09:02 CST      JOSE Eaton  11/8/2022  09:02 CST

## 2022-11-15 ENCOUNTER — ROUTINE PRENATAL (OUTPATIENT)
Dept: OBSTETRICS AND GYNECOLOGY | Facility: CLINIC | Age: 27
End: 2022-11-15

## 2022-11-15 VITALS — WEIGHT: 216.8 LBS | DIASTOLIC BLOOD PRESSURE: 82 MMHG | SYSTOLIC BLOOD PRESSURE: 129 MMHG | BODY MASS INDEX: 36.08 KG/M2

## 2022-11-15 DIAGNOSIS — Z34.90 ENCOUNTER FOR ELECTIVE INDUCTION OF LABOR: Primary | ICD-10-CM

## 2022-11-15 DIAGNOSIS — Z86.19 HISTORY OF HERPES GENITALIS: ICD-10-CM

## 2022-11-15 DIAGNOSIS — K59.09 CHRONIC CONSTIPATION: ICD-10-CM

## 2022-11-15 DIAGNOSIS — Z34.80 SUPERVISION OF OTHER NORMAL PREGNANCY, ANTEPARTUM: ICD-10-CM

## 2022-11-15 DIAGNOSIS — R03.0 ELEVATED BLOOD PRESSURE READING WITHOUT DIAGNOSIS OF HYPERTENSION: ICD-10-CM

## 2022-11-15 DIAGNOSIS — U07.1 COVID-19 VIRUS DETECTED: ICD-10-CM

## 2022-11-15 PROCEDURE — 99213 OFFICE O/P EST LOW 20 MIN: CPT | Performed by: STUDENT IN AN ORGANIZED HEALTH CARE EDUCATION/TRAINING PROGRAM

## 2022-11-15 NOTE — PROGRESS NOTES
CC: Prenatal visit    Sandra Botello is a 27 y.o.  at 38w0d.  Doing well.  Denies contractions, LOF, or VB.  Reports good FM. Occasional cramping.    /82   Wt 98.3 kg (216 lb 12.8 oz)   LMP 2022   BMI 36.08 kg/m²   SVE: 1.5/25/-2     Fetal Heart Rate: 153 us     Prelim US: cephalic, right posterior placenta, ALBINO 8.23 cm,  bpm, BPP 8/8      A/P: Sandra Botello is a 27 y.o.  at 38w0d.  - RTC in 1 weeks  - Reviewed COVID-19 visitation policy  - Reviewed COVID-19 precautions    Problem List Items Addressed This Visit        Cardiac and Vasculature    Elevated blood pressure reading without diagnosis of hypertension       Gastrointestinal Abdominal     Chronic constipation    Overview     Chronic constipation, internal hemorrhoids prior to pregnancy  Continue stool softener prn, may need to add Miralax            Gravid and     Supervision of other normal pregnancy, antepartum - Primary    Overview     H/o spontaneous sab x1, early  Rh+  RI  IOB Hgb 13.1, Plt 206  NIPS: Low risk male  HSV-needs suppression 35-36 weeks            Other    History of herpes genitalis    Overview     Plan for suppression at 35-36 weeks         COVID-19 virus detected    Overview     Status post COVID in early 2nd trimester. Mild symptoms, no hospitalization.   Consider BPPs, serial growth US in pregnancy    Opted for weekly BPP for monitoring, discussed r/b.               Diagnosis Plan   1. Supervision of other normal pregnancy, antepartum        2. COVID-19 virus detected        3. History of herpes genitalis        4. Chronic constipation        5. Elevated blood pressure reading without diagnosis of hypertension          Edith Olivo DO  2022  21:17 CST

## 2022-11-21 RX ORDER — PROMETHAZINE HYDROCHLORIDE 25 MG/1
25 TABLET ORAL EVERY 6 HOURS PRN
Status: CANCELLED | OUTPATIENT
Start: 2022-11-21

## 2022-11-21 RX ORDER — MISOPROSTOL 100 MCG
50 TABLET ORAL EVERY 6 HOURS
Status: CANCELLED | OUTPATIENT
Start: 2022-11-21 | End: 2022-11-22

## 2022-11-21 RX ORDER — ONDANSETRON 2 MG/ML
4 INJECTION INTRAMUSCULAR; INTRAVENOUS EVERY 6 HOURS PRN
Status: CANCELLED | OUTPATIENT
Start: 2022-11-21

## 2022-11-21 RX ORDER — PROMETHAZINE HYDROCHLORIDE 25 MG/1
12.5 SUPPOSITORY RECTAL EVERY 6 HOURS PRN
Status: CANCELLED | OUTPATIENT
Start: 2022-11-21

## 2022-11-21 RX ORDER — MISOPROSTOL 100 UG/1
800 TABLET ORAL AS NEEDED
Status: CANCELLED | OUTPATIENT
Start: 2022-11-21

## 2022-11-21 RX ORDER — BUTORPHANOL TARTRATE 1 MG/ML
2 INJECTION, SOLUTION INTRAMUSCULAR; INTRAVENOUS
Status: CANCELLED | OUTPATIENT
Start: 2022-11-21

## 2022-11-21 RX ORDER — SODIUM CHLORIDE, SODIUM LACTATE, POTASSIUM CHLORIDE, CALCIUM CHLORIDE 600; 310; 30; 20 MG/100ML; MG/100ML; MG/100ML; MG/100ML
125 INJECTION, SOLUTION INTRAVENOUS CONTINUOUS
Status: CANCELLED | OUTPATIENT
Start: 2022-11-21

## 2022-11-21 RX ORDER — ONDANSETRON 4 MG/1
4 TABLET, FILM COATED ORAL EVERY 6 HOURS PRN
Status: CANCELLED | OUTPATIENT
Start: 2022-11-21

## 2022-11-21 RX ORDER — SODIUM CHLORIDE 0.9 % (FLUSH) 0.9 %
3-10 SYRINGE (ML) INJECTION AS NEEDED
Status: CANCELLED | OUTPATIENT
Start: 2022-11-21

## 2022-11-21 RX ORDER — IBUPROFEN 200 MG
800 TABLET ORAL EVERY 8 HOURS
Status: CANCELLED | OUTPATIENT
Start: 2022-11-21

## 2022-11-21 RX ORDER — SODIUM CHLORIDE 0.9 % (FLUSH) 0.9 %
3 SYRINGE (ML) INJECTION EVERY 12 HOURS SCHEDULED
Status: CANCELLED | OUTPATIENT
Start: 2022-11-21

## 2022-11-21 RX ORDER — CARBOPROST TROMETHAMINE 250 UG/ML
250 INJECTION, SOLUTION INTRAMUSCULAR AS NEEDED
Status: CANCELLED | OUTPATIENT
Start: 2022-11-21

## 2022-11-21 RX ORDER — ACETAMINOPHEN 325 MG/1
1000 TABLET ORAL EVERY 6 HOURS
Status: CANCELLED | OUTPATIENT
Start: 2022-11-21

## 2022-11-21 RX ORDER — BUTORPHANOL TARTRATE 1 MG/ML
1 INJECTION, SOLUTION INTRAMUSCULAR; INTRAVENOUS
Status: CANCELLED | OUTPATIENT
Start: 2022-11-21

## 2022-11-21 RX ORDER — METHYLERGONOVINE MALEATE 0.2 MG/ML
200 INJECTION INTRAVENOUS ONCE AS NEEDED
Status: CANCELLED | OUTPATIENT
Start: 2022-11-21

## 2022-11-21 RX ORDER — LIDOCAINE HYDROCHLORIDE 10 MG/ML
5 INJECTION, SOLUTION EPIDURAL; INFILTRATION; INTRACAUDAL; PERINEURAL AS NEEDED
Status: CANCELLED | OUTPATIENT
Start: 2022-11-21

## 2022-11-22 ENCOUNTER — ANESTHESIA (OUTPATIENT)
Dept: LABOR AND DELIVERY | Facility: HOSPITAL | Age: 27
End: 2022-11-22
Payer: MEDICAID

## 2022-11-22 ENCOUNTER — HOSPITAL ENCOUNTER (INPATIENT)
Dept: LABOR AND DELIVERY | Facility: HOSPITAL | Age: 27
Discharge: HOME | End: 2022-11-22

## 2022-11-22 ENCOUNTER — ANESTHESIA EVENT (OUTPATIENT)
Dept: LABOR AND DELIVERY | Facility: HOSPITAL | Age: 27
End: 2022-11-22
Payer: MEDICAID

## 2022-11-22 ENCOUNTER — HOSPITAL ENCOUNTER (INPATIENT)
Facility: HOSPITAL | Age: 27
LOS: 3 days | Discharge: HOME | End: 2022-11-25
Attending: STUDENT IN AN ORGANIZED HEALTH CARE EDUCATION/TRAINING PROGRAM | Admitting: STUDENT IN AN ORGANIZED HEALTH CARE EDUCATION/TRAINING PROGRAM
Payer: MEDICAID

## 2022-11-22 DIAGNOSIS — I10 HYPERTENSION, UNSPECIFIED TYPE: ICD-10-CM

## 2022-11-22 DIAGNOSIS — Z98.891 STATUS POST PRIMARY LOW TRANSVERSE CESAREAN SECTION: Primary | ICD-10-CM

## 2022-11-22 DIAGNOSIS — Z34.90 ENCOUNTER FOR ELECTIVE INDUCTION OF LABOR: ICD-10-CM

## 2022-11-22 LAB
ABO GROUP BLD: NORMAL
AMPHET+METHAMPHET UR QL: NEGATIVE
AMPHETAMINES UR QL: NEGATIVE
BARBITURATES UR QL SCN: NEGATIVE
BENZODIAZ UR QL SCN: NEGATIVE
BLD GP AB SCN SERPL QL: NEGATIVE
BUPRENORPHINE SERPL-MCNC: NEGATIVE NG/ML
CANNABINOIDS SERPL QL: NEGATIVE
COCAINE UR QL: NEGATIVE
DEPRECATED RDW RBC AUTO: 41.7 FL (ref 37–54)
ERYTHROCYTE [DISTWIDTH] IN BLOOD BY AUTOMATED COUNT: 13.2 % (ref 12.3–15.4)
HCT VFR BLD AUTO: 36.4 % (ref 34–46.6)
HGB BLD-MCNC: 13.1 G/DL (ref 12–15.9)
Lab: NORMAL
MCH RBC QN AUTO: 31.4 PG (ref 26.6–33)
MCHC RBC AUTO-ENTMCNC: 36 G/DL (ref 31.5–35.7)
MCV RBC AUTO: 87.3 FL (ref 79–97)
METHADONE UR QL SCN: NEGATIVE
OPIATES UR QL: NEGATIVE
OXYCODONE UR QL SCN: NEGATIVE
PCP UR QL SCN: NEGATIVE
PLATELET # BLD AUTO: 118 10*3/MM3 (ref 140–450)
PMV BLD AUTO: 12.2 FL (ref 6–12)
PROPOXYPH UR QL: NEGATIVE
RBC # BLD AUTO: 4.17 10*6/MM3 (ref 3.77–5.28)
RH BLD: POSITIVE
T&S EXPIRATION DATE: NORMAL
TRICYCLICS UR QL SCN: NEGATIVE
WBC NRBC COR # BLD: 8.74 10*3/MM3 (ref 3.4–10.8)

## 2022-11-22 PROCEDURE — 86900 BLOOD TYPING SEROLOGIC ABO: CPT | Performed by: STUDENT IN AN ORGANIZED HEALTH CARE EDUCATION/TRAINING PROGRAM

## 2022-11-22 PROCEDURE — C1755 CATHETER, INTRASPINAL: HCPCS | Performed by: NURSE ANESTHETIST, CERTIFIED REGISTERED

## 2022-11-22 PROCEDURE — 25010000002 BUTORPHANOL PER 1 MG: Performed by: STUDENT IN AN ORGANIZED HEALTH CARE EDUCATION/TRAINING PROGRAM

## 2022-11-22 PROCEDURE — 86850 RBC ANTIBODY SCREEN: CPT | Performed by: STUDENT IN AN ORGANIZED HEALTH CARE EDUCATION/TRAINING PROGRAM

## 2022-11-22 PROCEDURE — 85027 COMPLETE CBC AUTOMATED: CPT | Performed by: STUDENT IN AN ORGANIZED HEALTH CARE EDUCATION/TRAINING PROGRAM

## 2022-11-22 PROCEDURE — 25010000002 ONDANSETRON PER 1 MG: Performed by: STUDENT IN AN ORGANIZED HEALTH CARE EDUCATION/TRAINING PROGRAM

## 2022-11-22 PROCEDURE — 80306 DRUG TEST PRSMV INSTRMNT: CPT | Performed by: STUDENT IN AN ORGANIZED HEALTH CARE EDUCATION/TRAINING PROGRAM

## 2022-11-22 PROCEDURE — 86901 BLOOD TYPING SEROLOGIC RH(D): CPT | Performed by: STUDENT IN AN ORGANIZED HEALTH CARE EDUCATION/TRAINING PROGRAM

## 2022-11-22 PROCEDURE — 25010000002 FENTANYL CITRATE (PF) 100 MCG/2ML SOLUTION: Performed by: NURSE ANESTHETIST, CERTIFIED REGISTERED

## 2022-11-22 RX ORDER — SODIUM CHLORIDE 0.9 % (FLUSH) 0.9 %
3-10 SYRINGE (ML) INJECTION AS NEEDED
Status: DISCONTINUED | OUTPATIENT
Start: 2022-11-22 | End: 2022-11-23

## 2022-11-22 RX ORDER — LIDOCAINE HYDROCHLORIDE AND EPINEPHRINE 15; 5 MG/ML; UG/ML
INJECTION, SOLUTION EPIDURAL AS NEEDED
Status: DISCONTINUED | OUTPATIENT
Start: 2022-11-22 | End: 2022-11-23 | Stop reason: SURG

## 2022-11-22 RX ORDER — MISOPROSTOL 100 MCG
50 TABLET ORAL EVERY 6 HOURS
Status: COMPLETED | OUTPATIENT
Start: 2022-11-22 | End: 2022-11-22

## 2022-11-22 RX ORDER — FENTANYL CITRATE 50 UG/ML
INJECTION, SOLUTION INTRAMUSCULAR; INTRAVENOUS AS NEEDED
Status: DISCONTINUED | OUTPATIENT
Start: 2022-11-22 | End: 2022-11-23 | Stop reason: SURG

## 2022-11-22 RX ORDER — SODIUM CHLORIDE, SODIUM LACTATE, POTASSIUM CHLORIDE, CALCIUM CHLORIDE 600; 310; 30; 20 MG/100ML; MG/100ML; MG/100ML; MG/100ML
INJECTION, SOLUTION INTRAVENOUS
Status: COMPLETED
Start: 2022-11-22 | End: 2022-11-22

## 2022-11-22 RX ORDER — ONDANSETRON 4 MG/1
4 TABLET, FILM COATED ORAL EVERY 6 HOURS PRN
Status: DISCONTINUED | OUTPATIENT
Start: 2022-11-22 | End: 2022-11-23

## 2022-11-22 RX ORDER — LIDOCAINE HYDROCHLORIDE 10 MG/ML
5 INJECTION, SOLUTION EPIDURAL; INFILTRATION; INTRACAUDAL; PERINEURAL AS NEEDED
Status: DISCONTINUED | OUTPATIENT
Start: 2022-11-22 | End: 2022-11-23

## 2022-11-22 RX ORDER — PROMETHAZINE HYDROCHLORIDE 25 MG/1
25 TABLET ORAL EVERY 6 HOURS PRN
Status: DISCONTINUED | OUTPATIENT
Start: 2022-11-22 | End: 2022-11-23

## 2022-11-22 RX ORDER — BUTORPHANOL TARTRATE 1 MG/ML
1 INJECTION, SOLUTION INTRAMUSCULAR; INTRAVENOUS
Status: DISCONTINUED | OUTPATIENT
Start: 2022-11-22 | End: 2022-11-23

## 2022-11-22 RX ORDER — SODIUM CHLORIDE 0.9 % (FLUSH) 0.9 %
3 SYRINGE (ML) INJECTION EVERY 12 HOURS SCHEDULED
Status: DISCONTINUED | OUTPATIENT
Start: 2022-11-22 | End: 2022-11-23

## 2022-11-22 RX ORDER — ONDANSETRON 2 MG/ML
4 INJECTION INTRAMUSCULAR; INTRAVENOUS EVERY 6 HOURS PRN
Status: DISCONTINUED | OUTPATIENT
Start: 2022-11-22 | End: 2022-11-23

## 2022-11-22 RX ORDER — PROMETHAZINE HYDROCHLORIDE 12.5 MG/1
12.5 SUPPOSITORY RECTAL EVERY 6 HOURS PRN
Status: DISCONTINUED | OUTPATIENT
Start: 2022-11-22 | End: 2022-11-23

## 2022-11-22 RX ORDER — SODIUM CHLORIDE, SODIUM LACTATE, POTASSIUM CHLORIDE, CALCIUM CHLORIDE 600; 310; 30; 20 MG/100ML; MG/100ML; MG/100ML; MG/100ML
125 INJECTION, SOLUTION INTRAVENOUS CONTINUOUS
Status: DISCONTINUED | OUTPATIENT
Start: 2022-11-22 | End: 2022-11-23

## 2022-11-22 RX ADMIN — SODIUM CHLORIDE, POTASSIUM CHLORIDE, SODIUM LACTATE AND CALCIUM CHLORIDE 125 ML/HR: 600; 310; 30; 20 INJECTION, SOLUTION INTRAVENOUS at 13:32

## 2022-11-22 RX ADMIN — ONDANSETRON 4 MG: 2 INJECTION INTRAMUSCULAR; INTRAVENOUS at 19:47

## 2022-11-22 RX ADMIN — SODIUM CHLORIDE, POTASSIUM CHLORIDE, SODIUM LACTATE AND CALCIUM CHLORIDE 1000 ML: 600; 310; 30; 20 INJECTION, SOLUTION INTRAVENOUS at 21:25

## 2022-11-22 RX ADMIN — MISOPROSTOL 50 MCG: 100 TABLET ORAL at 19:30

## 2022-11-22 RX ADMIN — LIDOCAINE HYDROCHLORIDE AND EPINEPHRINE 3 ML: 15; 5 INJECTION, SOLUTION EPIDURAL at 21:44

## 2022-11-22 RX ADMIN — MISOPROSTOL 50 MCG: 100 TABLET ORAL at 07:09

## 2022-11-22 RX ADMIN — BUTORPHANOL TARTRATE 2 MG: 2 INJECTION, SOLUTION INTRAMUSCULAR; INTRAVENOUS at 19:30

## 2022-11-22 RX ADMIN — FENTANYL CITRATE 200 MCG: 50 INJECTION, SOLUTION INTRAMUSCULAR; INTRAVENOUS at 21:50

## 2022-11-22 RX ADMIN — SODIUM CHLORIDE, POTASSIUM CHLORIDE, SODIUM LACTATE AND CALCIUM CHLORIDE 125 ML/HR: 600; 310; 30; 20 INJECTION, SOLUTION INTRAVENOUS at 06:30

## 2022-11-22 RX ADMIN — BUTORPHANOL TARTRATE 1 MG: 1 INJECTION, SOLUTION INTRAMUSCULAR; INTRAVENOUS at 13:32

## 2022-11-22 RX ADMIN — MISOPROSTOL 50 MCG: 100 TABLET ORAL at 13:26

## 2022-11-22 RX ADMIN — SODIUM CHLORIDE, POTASSIUM CHLORIDE, SODIUM LACTATE AND CALCIUM CHLORIDE 125 ML/HR: 600; 310; 30; 20 INJECTION, SOLUTION INTRAVENOUS at 22:19

## 2022-11-22 NOTE — H&P (VIEW-ONLY)
HISTORY & PHYSICAL - Obstetrics  [facility]    Name: Sandra Botello  MRN: [id number]  Location: Room/bed info not found  Date: 11/15/2022  CSN: [id number]      CHIEF COMPLAINT:  Routine PNC, scheduled EIOL    HISTORY OF PRESENT ILLNESS  Sandra Botello is a 27 y.o.  at 38w0d gestational age by LMP =1 TRI US suggesting Estimated Date of Delivery: 22.  The patient presents for routine PNC, scheduled for EIOL.  Denies LOF.  Denies  vaginal bleeding.  Denies regular painful contractions, occasional cramping.  Good FM.    Patient denies any chest pain, palpitations, headaches, lightheadedness, shortness of breath, cough, nausea, vomiting, diarrhea, constipation, fever, or chills.    ROS  Review of Systems   Constitutional: Negative.    HENT: Negative.    Respiratory: Negative.    Cardiovascular: Negative.    Gastrointestinal: Negative.    Genitourinary: Negative.    Musculoskeletal: Negative.    Skin: Negative.    Psychiatric/Behavioral: Negative.        PRENATAL LAB RESULTS  Prenatal labs reviewed  External Prenatal Results     Pregnancy Outside Results - Transcribed From Office Records - See Scanned Records For Details     Test Value Date Time    ABO  O  22 0856    Rh  Positive  22 0856    Antibody Screen  Negative  22 0856    Varicella IgG       Rubella  1.84 index 22 0856    Hgb  11.6 g/dL 22 0921       13.1 g/dL 22 0856    Hct  32.9 % 22 0921       37.0 % 22 0856    Glucose Fasting GTT       Glucose Tolerance Test 1 hour       Glucose Tolerance Test 3 hour       Gonorrhea (discrete)  Negative  22 0856    Chlamydia (discrete)  Negative  22 0856    RPR  Non-Reactive  22 0856    VDRL       Syphilis Antibody       HBsAg  Non-Reactive  22 08    Herpes Simplex Virus PCR       Herpes Simplex VIrus Culture  Positive  21 0930    HIV  Non-Reactive  22 0856    Hep C RNA Quant PCR       Hep C Antibody   Non-Reactive  04/05/22 0856    AFP       Group B Strep  Negative  11/01/22 0909    GBS Susceptibility to Clindamycin       GBS Susceptibility to Erythromycin       Fetal Fibronectin       Genetic Testing, Maternal Blood             Drug Screening     Test Value Date Time    Urine Drug Screen       Amphetamine Screen  Negative  04/05/22 0856    Barbiturate Screen  Negative  04/05/22 0856    Benzodiazepine Screen  Negative  04/05/22 0856    Methadone Screen  Negative  04/05/22 0856    Phencyclidine Screen  Negative  04/05/22 0856    Opiates Screen  Negative  04/05/22 0856    THC Screen  Negative  04/05/22 0856    Cocaine Screen       Propoxyphene Screen  Negative  04/05/22 0856    Buprenorphine Screen  Negative  04/05/22 0856    Methamphetamine Screen       Oxycodone Screen  Negative  04/05/22 0856    Tricyclic Antidepressants Screen  Negative  04/05/22 0856          Legend    ^: Historical                        PRENATAL RISK FACTORS  4/22 Problems (from 04/05/22 to present)     Problem Noted Resolved    Elevated blood pressure reading without diagnosis of hypertension 9/6/2022 by Edith Olivo DO No    COVID-19 virus detected 6/21/2022 by Edith Olivo DO No    Overview Addendum 11/1/2022 10:08 AM by Edith Olivo DO     Status post COVID in early 2nd trimester. Mild symptoms, no hospitalization.   Consider BPPs, serial growth US in pregnancy    Opted for weekly BPP for monitoring, discussed r/b.         History of herpes genitalis 4/24/2022 by Edith Olivo DO No    Overview Signed 4/24/2022  5:23 PM by Edith Olivo DO     Plan for suppression at 35-36 weeks         Chronic constipation 4/24/2022 by Edith Olivo DO No    Overview Signed 4/24/2022  5:25 PM by Edith Olivo DO     Chronic constipation, internal hemorrhoids prior to pregnancy  Continue stool softener prn, may need to add Miralax         Supervision of other normal pregnancy, antepartum 4/24/2022 by Edith Olivo DO No    Overview Addendum  2022  5:10 PM by Edith Olivo DO     H/o spontaneous sab x1, early  Rh+  RI  IOB Hgb 13.1, Plt 206  NIPS: Low risk male  HSV-needs suppression 35-36 weeks         Urinary tract infection in mother during pregnancy, antepartum 2022 by Edith Olivo, DO 2022 by Edith Olivo DO    Overview Signed 2022  5:08 PM by Edith Olivo DO     Repeat urine culture negative               DATING SUMMARY  LMP (22) -- HEAVENLY 22  1 TRI US (22 at 8w3d) -- HEAVENLY 22    OB HISTORY  OB History    Para Term  AB Living   2 0 0 0 1 0   SAB IAB Ectopic Molar Multiple Live Births   1 0 0 0 0 0      # Outcome Date GA Lbr Lux/2nd Weight Sex Delivery Anes PTL Lv   2 Current            1 SAB 2021             GYN HISTORY  Denies h/o sexually transmitted infections/pelvic inflammatory disease  Denies h/o abnormal pap smears, last pap was 9/10/21 NIL, absent transformation zone, consider repeat PP  Denies h/o gynecologic surgeries, including biopsies of the cervix    PAST MEDICAL HISTORY  Past Medical History:   Diagnosis Date   • Herpes    • Urinary tract infection in mother during pregnancy, antepartum 2022     PAST SURGICAL HISTORY  No past surgical history on file.  FAMILY HISTORY  Family History   Problem Relation Age of Onset   • Melanoma Mother    • Hypertension Father    • No Known Problems Sister    • No Known Problems Brother    • No Known Problems Maternal Grandmother    • Cancer Maternal Grandfather    • Thyroid disease Maternal Grandfather    • Hypertension Paternal Grandmother    • Diabetes Paternal Grandmother    • Depression Paternal Grandmother      SOCIAL HISTORY  Social History     Socioeconomic History   • Marital status: Single   Tobacco Use   • Smoking status: Never   • Smokeless tobacco: Never   Vaping Use   • Vaping Use: Never used   Substance and Sexual Activity   • Alcohol use: No   • Drug use: No   • Sexual activity: Yes     Partners: Male     Birth  control/protection: None     Comment: last pap smear negative on 9/10/21     ALLERGIES  No Known Allergies  HOME MEDICATIONS  Prior to Admission medications    Medication Sig Start Date End Date Taking? Authorizing Provider   docusate sodium (Colace) 100 MG capsule Take 1 capsule by mouth 2 (Two) Times a Day. 3/8/22  Yes Zaida Farah APRN   hydrocortisone (ANUSOL-HC) 25 MG suppository Insert 1 suppository into the rectum 2 (Two) Times a Day. 3/29/22  Yes Zaida Farah APRN   Hydrocortisone, Perianal, (ANUSOL-HC) 2.5 % rectal cream Insert  into the rectum 2 (Two) Times a Day. 3/29/22  Yes Zaida Farah APRN   prenatal vitamin (prenatal, CLASSIC, vitamin) tablet Take  by mouth Daily.   Yes Provider, MD Chris   triamcinolone (KENALOG) 0.1 % ointment  22  Yes Provider, MD Chris     PHYSICAL EXAM  /82   Wt 98.3 kg (216 lb 12.8 oz)   LMP 2022   BMI 36.08 kg/m²   General: No acute distress. Well developed, well nourished. Rosmery.  Heart: Regular rate and rhythm  Lungs: No increased work of breathing  Abdomen: Soft, nontender to palpation, enlarged by gravid uterus.    NST Review  NST on admission    SVE: 1.5//-3    Prelim US: cephalic, right posterior placenta, ALBINO 8.23 cm,  bpm, BPP 8/8    IMPRESSION  Sandra Botello is a 27 y.o.  at 38w0d presenting for routine PNC, scheduled for EIOL.    PLAN  1.  IUP at 39+0 on  scheduled for EIOL  - Admit: Labor and Delivery  - Attending: Dr. Olivo  - Condition: Stable  - Vitals: per protocol  - Activity: ad jenn  - Nursing: Continuous electronic fetal monitoring, as per protocol  - Diet: Clears  - IV fluids:  mL/hr  - Meds: prn tylenol, zofran, stadol  - Allergies: NKDA  - Labs: CBC, T&S, UDS  - GBS: neg.  Antibiotics:  Not indicated  - Sandra Botello and I have discussed pain goals for this hospitalization after reviewing her current clinical condition, medical history and prior pain experiences.  The goal is  to keep her pain level manageable.  Will discuss during admission if patient does desire an epidural  - If still 1.3 will likely start with cytotec for IOL, possibly with CRB  - Anticipate         This document has been electronically signed by Edith Olivo DO on 2022 21:20 CST

## 2022-11-22 NOTE — H&P
HISTORY & PHYSICAL - Obstetrics  [facility]    Name: Sandra Botello  MRN: [id number]  Location: Room/bed info not found  Date: 11/15/2022  CSN: [id number]      CHIEF COMPLAINT:  Routine PNC, scheduled EIOL    HISTORY OF PRESENT ILLNESS  Sandra Botello is a 27 y.o.  at 38w0d gestational age by LMP =1 TRI US suggesting Estimated Date of Delivery: 22.  The patient presents for routine PNC, scheduled for EIOL.  Denies LOF.  Denies  vaginal bleeding.  Denies regular painful contractions, occasional cramping.  Good FM.    Patient denies any chest pain, palpitations, headaches, lightheadedness, shortness of breath, cough, nausea, vomiting, diarrhea, constipation, fever, or chills.    ROS  Review of Systems   Constitutional: Negative.    HENT: Negative.    Respiratory: Negative.    Cardiovascular: Negative.    Gastrointestinal: Negative.    Genitourinary: Negative.    Musculoskeletal: Negative.    Skin: Negative.    Psychiatric/Behavioral: Negative.        PRENATAL LAB RESULTS  Prenatal labs reviewed  External Prenatal Results     Pregnancy Outside Results - Transcribed From Office Records - See Scanned Records For Details     Test Value Date Time    ABO  O  22 0856    Rh  Positive  22 0856    Antibody Screen  Negative  22 0856    Varicella IgG       Rubella  1.84 index 22 0856    Hgb  11.6 g/dL 22 0921       13.1 g/dL 22 0856    Hct  32.9 % 22 0921       37.0 % 22 0856    Glucose Fasting GTT       Glucose Tolerance Test 1 hour       Glucose Tolerance Test 3 hour       Gonorrhea (discrete)  Negative  22 0856    Chlamydia (discrete)  Negative  22 0856    RPR  Non-Reactive  22 0856    VDRL       Syphilis Antibody       HBsAg  Non-Reactive  22 08    Herpes Simplex Virus PCR       Herpes Simplex VIrus Culture  Positive  21 0930    HIV  Non-Reactive  22 0856    Hep C RNA Quant PCR       Hep C Antibody   Non-Reactive  04/05/22 0856    AFP       Group B Strep  Negative  11/01/22 0909    GBS Susceptibility to Clindamycin       GBS Susceptibility to Erythromycin       Fetal Fibronectin       Genetic Testing, Maternal Blood             Drug Screening     Test Value Date Time    Urine Drug Screen       Amphetamine Screen  Negative  04/05/22 0856    Barbiturate Screen  Negative  04/05/22 0856    Benzodiazepine Screen  Negative  04/05/22 0856    Methadone Screen  Negative  04/05/22 0856    Phencyclidine Screen  Negative  04/05/22 0856    Opiates Screen  Negative  04/05/22 0856    THC Screen  Negative  04/05/22 0856    Cocaine Screen       Propoxyphene Screen  Negative  04/05/22 0856    Buprenorphine Screen  Negative  04/05/22 0856    Methamphetamine Screen       Oxycodone Screen  Negative  04/05/22 0856    Tricyclic Antidepressants Screen  Negative  04/05/22 0856          Legend    ^: Historical                        PRENATAL RISK FACTORS  4/22 Problems (from 04/05/22 to present)     Problem Noted Resolved    Elevated blood pressure reading without diagnosis of hypertension 9/6/2022 by Edith Olivo DO No    COVID-19 virus detected 6/21/2022 by Edith Olivo DO No    Overview Addendum 11/1/2022 10:08 AM by Edith Olivo DO     Status post COVID in early 2nd trimester. Mild symptoms, no hospitalization.   Consider BPPs, serial growth US in pregnancy    Opted for weekly BPP for monitoring, discussed r/b.         History of herpes genitalis 4/24/2022 by Edith Olivo DO No    Overview Signed 4/24/2022  5:23 PM by Edith Olivo DO     Plan for suppression at 35-36 weeks         Chronic constipation 4/24/2022 by Edith Olivo DO No    Overview Signed 4/24/2022  5:25 PM by Edith Olivo DO     Chronic constipation, internal hemorrhoids prior to pregnancy  Continue stool softener prn, may need to add Miralax         Supervision of other normal pregnancy, antepartum 4/24/2022 by Edith Olivo DO No    Overview Addendum  2022  5:10 PM by Edith Olivo DO     H/o spontaneous sab x1, early  Rh+  RI  IOB Hgb 13.1, Plt 206  NIPS: Low risk male  HSV-needs suppression 35-36 weeks         Urinary tract infection in mother during pregnancy, antepartum 2022 by Edith Olivo, DO 2022 by Edith Olivo DO    Overview Signed 2022  5:08 PM by Edith Olivo DO     Repeat urine culture negative               DATING SUMMARY  LMP (22) -- HEAVENLY 22  1 TRI US (22 at 8w3d) -- HEAVENLY 22    OB HISTORY  OB History    Para Term  AB Living   2 0 0 0 1 0   SAB IAB Ectopic Molar Multiple Live Births   1 0 0 0 0 0      # Outcome Date GA Lbr Lux/2nd Weight Sex Delivery Anes PTL Lv   2 Current            1 SAB 2021             GYN HISTORY  Denies h/o sexually transmitted infections/pelvic inflammatory disease  Denies h/o abnormal pap smears, last pap was 9/10/21 NIL, absent transformation zone, consider repeat PP  Denies h/o gynecologic surgeries, including biopsies of the cervix    PAST MEDICAL HISTORY  Past Medical History:   Diagnosis Date   • Herpes    • Urinary tract infection in mother during pregnancy, antepartum 2022     PAST SURGICAL HISTORY  No past surgical history on file.  FAMILY HISTORY  Family History   Problem Relation Age of Onset   • Melanoma Mother    • Hypertension Father    • No Known Problems Sister    • No Known Problems Brother    • No Known Problems Maternal Grandmother    • Cancer Maternal Grandfather    • Thyroid disease Maternal Grandfather    • Hypertension Paternal Grandmother    • Diabetes Paternal Grandmother    • Depression Paternal Grandmother      SOCIAL HISTORY  Social History     Socioeconomic History   • Marital status: Single   Tobacco Use   • Smoking status: Never   • Smokeless tobacco: Never   Vaping Use   • Vaping Use: Never used   Substance and Sexual Activity   • Alcohol use: No   • Drug use: No   • Sexual activity: Yes     Partners: Male     Birth  control/protection: None     Comment: last pap smear negative on 9/10/21     ALLERGIES  No Known Allergies  HOME MEDICATIONS  Prior to Admission medications    Medication Sig Start Date End Date Taking? Authorizing Provider   docusate sodium (Colace) 100 MG capsule Take 1 capsule by mouth 2 (Two) Times a Day. 3/8/22  Yes Zaida Farah APRN   hydrocortisone (ANUSOL-HC) 25 MG suppository Insert 1 suppository into the rectum 2 (Two) Times a Day. 3/29/22  Yes Zaida Farah APRN   Hydrocortisone, Perianal, (ANUSOL-HC) 2.5 % rectal cream Insert  into the rectum 2 (Two) Times a Day. 3/29/22  Yes Zaida Farah APRN   prenatal vitamin (prenatal, CLASSIC, vitamin) tablet Take  by mouth Daily.   Yes Provider, MD Chris   triamcinolone (KENALOG) 0.1 % ointment  22  Yes Provider, MD Chris     PHYSICAL EXAM  /82   Wt 98.3 kg (216 lb 12.8 oz)   LMP 2022   BMI 36.08 kg/m²   General: No acute distress. Well developed, well nourished. Rosmery.  Heart: Regular rate and rhythm  Lungs: No increased work of breathing  Abdomen: Soft, nontender to palpation, enlarged by gravid uterus.    NST Review  NST on admission    SVE: 1.5//-3    Prelim US: cephalic, right posterior placenta, ALBINO 8.23 cm,  bpm, BPP 8/8    IMPRESSION  Sandra Botello is a 27 y.o.  at 38w0d presenting for routine PNC, scheduled for EIOL.    PLAN  1.  IUP at 39+0 on  scheduled for EIOL  - Admit: Labor and Delivery  - Attending: Dr. Olivo  - Condition: Stable  - Vitals: per protocol  - Activity: ad jenn  - Nursing: Continuous electronic fetal monitoring, as per protocol  - Diet: Clears  - IV fluids:  mL/hr  - Meds: prn tylenol, zofran, stadol  - Allergies: NKDA  - Labs: CBC, T&S, UDS  - GBS: neg.  Antibiotics:  Not indicated  - Sandra Botello and I have discussed pain goals for this hospitalization after reviewing her current clinical condition, medical history and prior pain experiences.  The goal is  to keep her pain level manageable.  Will discuss during admission if patient does desire an epidural  - If still 1.3 will likely start with cytotec for IOL, possibly with CRB  - Anticipate         This document has been electronically signed by Edith Olivo DO on 2022 21:20 CST

## 2022-11-23 PROBLEM — Z98.891 HISTORY OF PRIMARY CESAREAN SECTION: Status: ACTIVE | Noted: 2022-11-23

## 2022-11-23 LAB
ALBUMIN SERPL-MCNC: 3.3 G/DL (ref 3.5–5.2)
ALBUMIN/GLOB SERPL: 1.1 G/DL
ALP SERPL-CCNC: 133 U/L (ref 39–117)
ALT SERPL W P-5'-P-CCNC: 13 U/L (ref 1–33)
ANION GAP SERPL CALCULATED.3IONS-SCNC: 12 MMOL/L (ref 5–15)
AST SERPL-CCNC: 17 U/L (ref 1–32)
BASOPHILS # BLD AUTO: 0.02 10*3/MM3 (ref 0–0.2)
BASOPHILS NFR BLD AUTO: 0.2 % (ref 0–1.5)
BILIRUB SERPL-MCNC: 0.2 MG/DL (ref 0–1.2)
BUN SERPL-MCNC: 9 MG/DL (ref 6–20)
BUN/CREAT SERPL: 13 (ref 7–25)
CALCIUM SPEC-SCNC: 8.8 MG/DL (ref 8.6–10.5)
CHLORIDE SERPL-SCNC: 104 MMOL/L (ref 98–107)
CO2 SERPL-SCNC: 23 MMOL/L (ref 22–29)
CREAT SERPL-MCNC: 0.69 MG/DL (ref 0.57–1)
DEPRECATED RDW RBC AUTO: 42.8 FL (ref 37–54)
EGFRCR SERPLBLD CKD-EPI 2021: 123.7 ML/MIN/1.73
EOSINOPHIL # BLD AUTO: 0.01 10*3/MM3 (ref 0–0.4)
EOSINOPHIL NFR BLD AUTO: 0.1 % (ref 0.3–6.2)
ERYTHROCYTE [DISTWIDTH] IN BLOOD BY AUTOMATED COUNT: 13.4 % (ref 12.3–15.4)
GLOBULIN UR ELPH-MCNC: 3.1 GM/DL
GLUCOSE SERPL-MCNC: 81 MG/DL (ref 65–99)
HCT VFR BLD AUTO: 37.5 % (ref 34–46.6)
HGB BLD-MCNC: 12.9 G/DL (ref 12–15.9)
IMM GRANULOCYTES # BLD AUTO: 0.04 10*3/MM3 (ref 0–0.05)
IMM GRANULOCYTES NFR BLD AUTO: 0.3 % (ref 0–0.5)
LYMPHOCYTES # BLD AUTO: 1.35 10*3/MM3 (ref 0.7–3.1)
LYMPHOCYTES NFR BLD AUTO: 10.3 % (ref 19.6–45.3)
MCH RBC QN AUTO: 30.4 PG (ref 26.6–33)
MCHC RBC AUTO-ENTMCNC: 34.4 G/DL (ref 31.5–35.7)
MCV RBC AUTO: 88.2 FL (ref 79–97)
MONOCYTES # BLD AUTO: 0.63 10*3/MM3 (ref 0.1–0.9)
MONOCYTES NFR BLD AUTO: 4.8 % (ref 5–12)
NEUTROPHILS NFR BLD AUTO: 11.02 10*3/MM3 (ref 1.7–7)
NEUTROPHILS NFR BLD AUTO: 84.3 % (ref 42.7–76)
NRBC BLD AUTO-RTO: 0 /100 WBC (ref 0–0.2)
PLATELET # BLD AUTO: 124 10*3/MM3 (ref 140–450)
PMV BLD AUTO: 11.8 FL (ref 6–12)
POTASSIUM SERPL-SCNC: 3.8 MMOL/L (ref 3.5–5.2)
PROT SERPL-MCNC: 6.4 G/DL (ref 6–8.5)
RBC # BLD AUTO: 4.25 10*6/MM3 (ref 3.77–5.28)
SODIUM SERPL-SCNC: 139 MMOL/L (ref 136–145)
WBC NRBC COR # BLD: 13.07 10*3/MM3 (ref 3.4–10.8)

## 2022-11-23 PROCEDURE — 25010000002 FENTANYL CITRATE (PF) 100 MCG/2ML SOLUTION: Performed by: NURSE ANESTHETIST, CERTIFIED REGISTERED

## 2022-11-23 PROCEDURE — 94799 UNLISTED PULMONARY SVC/PX: CPT

## 2022-11-23 PROCEDURE — 25010000002 CEFAZOLIN PER 500 MG: Performed by: STUDENT IN AN ORGANIZED HEALTH CARE EDUCATION/TRAINING PROGRAM

## 2022-11-23 PROCEDURE — 88307 TISSUE EXAM BY PATHOLOGIST: CPT

## 2022-11-23 PROCEDURE — 25010000002 KETOROLAC TROMETHAMINE PER 15 MG: Performed by: NURSE ANESTHETIST, CERTIFIED REGISTERED

## 2022-11-23 PROCEDURE — 25010000002 MORPHINE PER 10 MG: Performed by: NURSE ANESTHETIST, CERTIFIED REGISTERED

## 2022-11-23 PROCEDURE — 25010000002 CEFAZOLIN PER 500 MG: Performed by: NURSE ANESTHETIST, CERTIFIED REGISTERED

## 2022-11-23 PROCEDURE — 25010000002 KETOROLAC TROMETHAMINE PER 15 MG: Performed by: STUDENT IN AN ORGANIZED HEALTH CARE EDUCATION/TRAINING PROGRAM

## 2022-11-23 PROCEDURE — 85025 COMPLETE CBC W/AUTO DIFF WBC: CPT | Performed by: STUDENT IN AN ORGANIZED HEALTH CARE EDUCATION/TRAINING PROGRAM

## 2022-11-23 PROCEDURE — 51703 INSERT BLADDER CATH COMPLEX: CPT

## 2022-11-23 PROCEDURE — 10907ZC DRAINAGE OF AMNIOTIC FLUID, THERAPEUTIC FROM PRODUCTS OF CONCEPTION, VIA NATURAL OR ARTIFICIAL OPENING: ICD-10-PCS | Performed by: STUDENT IN AN ORGANIZED HEALTH CARE EDUCATION/TRAINING PROGRAM

## 2022-11-23 PROCEDURE — 25010000002 ONDANSETRON PER 1 MG: Performed by: NURSE ANESTHETIST, CERTIFIED REGISTERED

## 2022-11-23 PROCEDURE — 25010000002 AZITHROMYCIN PER 500 MG: Performed by: STUDENT IN AN ORGANIZED HEALTH CARE EDUCATION/TRAINING PROGRAM

## 2022-11-23 PROCEDURE — 59514 CESAREAN DELIVERY ONLY: CPT

## 2022-11-23 PROCEDURE — 0 MAGNESIUM SULFATE 20 GM/500ML SOLUTION: Performed by: STUDENT IN AN ORGANIZED HEALTH CARE EDUCATION/TRAINING PROGRAM

## 2022-11-23 PROCEDURE — 3E033VJ INTRODUCTION OF OTHER HORMONE INTO PERIPHERAL VEIN, PERCUTANEOUS APPROACH: ICD-10-PCS | Performed by: STUDENT IN AN ORGANIZED HEALTH CARE EDUCATION/TRAINING PROGRAM

## 2022-11-23 PROCEDURE — 25010000002 PROPOFOL 10 MG/ML EMULSION: Performed by: NURSE ANESTHETIST, CERTIFIED REGISTERED

## 2022-11-23 PROCEDURE — 59514 CESAREAN DELIVERY ONLY: CPT | Performed by: STUDENT IN AN ORGANIZED HEALTH CARE EDUCATION/TRAINING PROGRAM

## 2022-11-23 PROCEDURE — 80053 COMPREHEN METABOLIC PANEL: CPT | Performed by: STUDENT IN AN ORGANIZED HEALTH CARE EDUCATION/TRAINING PROGRAM

## 2022-11-23 PROCEDURE — 25010000002 ROPIVACAINE PER 1 MG: Performed by: STUDENT IN AN ORGANIZED HEALTH CARE EDUCATION/TRAINING PROGRAM

## 2022-11-23 PROCEDURE — C1755 CATHETER, INTRASPINAL: HCPCS

## 2022-11-23 DEVICE — SEAL HEMO SURG ARISTA/AH ABS/PWDR 3GM: Type: IMPLANTABLE DEVICE | Site: UTERUS | Status: FUNCTIONAL

## 2022-11-23 RX ORDER — ROPIVACAINE HYDROCHLORIDE 5 MG/ML
INJECTION, SOLUTION EPIDURAL; INFILTRATION; PERINEURAL AS NEEDED
Status: DISCONTINUED | OUTPATIENT
Start: 2022-11-23 | End: 2022-11-25 | Stop reason: HOSPADM

## 2022-11-23 RX ORDER — CALCIUM CARBONATE 200(500)MG
1 TABLET,CHEWABLE ORAL EVERY 4 HOURS PRN
Status: DISCONTINUED | OUTPATIENT
Start: 2022-11-23 | End: 2022-11-25 | Stop reason: HOSPADM

## 2022-11-23 RX ORDER — CARBOPROST TROMETHAMINE 250 UG/ML
250 INJECTION, SOLUTION INTRAMUSCULAR ONCE
Status: DISCONTINUED | OUTPATIENT
Start: 2022-11-23 | End: 2022-11-25 | Stop reason: HOSPADM

## 2022-11-23 RX ORDER — SIMETHICONE 80 MG
80 TABLET,CHEWABLE ORAL 4 TIMES DAILY
Status: DISCONTINUED | OUTPATIENT
Start: 2022-11-23 | End: 2022-11-25 | Stop reason: HOSPADM

## 2022-11-23 RX ORDER — CEFAZOLIN SODIUM 1 G/3ML
INJECTION, POWDER, FOR SOLUTION INTRAMUSCULAR; INTRAVENOUS AS NEEDED
Status: DISCONTINUED | OUTPATIENT
Start: 2022-11-23 | End: 2022-11-23 | Stop reason: SURG

## 2022-11-23 RX ORDER — PROPOFOL 10 MG/ML
VIAL (ML) INTRAVENOUS AS NEEDED
Status: DISCONTINUED | OUTPATIENT
Start: 2022-11-23 | End: 2022-11-23 | Stop reason: SURG

## 2022-11-23 RX ORDER — CARBOPROST TROMETHAMINE 250 UG/ML
250 INJECTION, SOLUTION INTRAMUSCULAR AS NEEDED
Status: DISCONTINUED | OUTPATIENT
Start: 2022-11-23 | End: 2022-11-23 | Stop reason: HOSPADM

## 2022-11-23 RX ORDER — ACETAMINOPHEN 325 MG/1
650 TABLET ORAL EVERY 6 HOURS
Status: DISCONTINUED | OUTPATIENT
Start: 2022-11-24 | End: 2022-11-25 | Stop reason: HOSPADM

## 2022-11-23 RX ORDER — ONDANSETRON 4 MG/1
4 TABLET, FILM COATED ORAL EVERY 8 HOURS PRN
Status: DISCONTINUED | OUTPATIENT
Start: 2022-11-23 | End: 2022-11-25 | Stop reason: HOSPADM

## 2022-11-23 RX ORDER — LABETALOL HYDROCHLORIDE 5 MG/ML
20-80 INJECTION, SOLUTION INTRAVENOUS
Status: DISCONTINUED | OUTPATIENT
Start: 2022-11-23 | End: 2022-11-25 | Stop reason: HOSPADM

## 2022-11-23 RX ORDER — ALUMINA, MAGNESIA, AND SIMETHICONE 2400; 2400; 240 MG/30ML; MG/30ML; MG/30ML
15 SUSPENSION ORAL EVERY 4 HOURS PRN
Status: DISCONTINUED | OUTPATIENT
Start: 2022-11-23 | End: 2022-11-25 | Stop reason: HOSPADM

## 2022-11-23 RX ORDER — OXYCODONE HYDROCHLORIDE 5 MG/1
5 TABLET ORAL EVERY 4 HOURS PRN
Status: DISCONTINUED | OUTPATIENT
Start: 2022-11-23 | End: 2022-11-25 | Stop reason: HOSPADM

## 2022-11-23 RX ORDER — MISOPROSTOL 200 UG/1
600 TABLET ORAL ONCE
Status: DISCONTINUED | OUTPATIENT
Start: 2022-11-23 | End: 2022-11-25 | Stop reason: HOSPADM

## 2022-11-23 RX ORDER — HYDRALAZINE HYDROCHLORIDE 20 MG/ML
5-10 INJECTION INTRAMUSCULAR; INTRAVENOUS
Status: DISCONTINUED | OUTPATIENT
Start: 2022-11-23 | End: 2022-11-25 | Stop reason: HOSPADM

## 2022-11-23 RX ORDER — KETOROLAC TROMETHAMINE 30 MG/ML
INJECTION, SOLUTION INTRAMUSCULAR; INTRAVENOUS AS NEEDED
Status: DISCONTINUED | OUTPATIENT
Start: 2022-11-23 | End: 2022-11-23 | Stop reason: SURG

## 2022-11-23 RX ORDER — ACETAMINOPHEN 500 MG
1000 TABLET ORAL EVERY 6 HOURS
Status: DISPENSED | OUTPATIENT
Start: 2022-11-23 | End: 2022-11-24

## 2022-11-23 RX ORDER — DOCUSATE SODIUM 100 MG/1
100 CAPSULE, LIQUID FILLED ORAL 2 TIMES DAILY PRN
Status: DISCONTINUED | OUTPATIENT
Start: 2022-11-23 | End: 2022-11-25 | Stop reason: HOSPADM

## 2022-11-23 RX ORDER — MORPHINE SULFATE 1 MG/ML
INJECTION, SOLUTION EPIDURAL; INTRATHECAL; INTRAVENOUS AS NEEDED
Status: DISCONTINUED | OUTPATIENT
Start: 2022-11-23 | End: 2022-11-23 | Stop reason: SURG

## 2022-11-23 RX ORDER — PRENATAL VIT/IRON FUM/FOLIC AC 27MG-0.8MG
1 TABLET ORAL DAILY
Status: DISCONTINUED | OUTPATIENT
Start: 2022-11-23 | End: 2022-11-25 | Stop reason: HOSPADM

## 2022-11-23 RX ORDER — ACETAMINOPHEN 500 MG
1000 TABLET ORAL EVERY 6 HOURS
Status: DISCONTINUED | OUTPATIENT
Start: 2022-11-23 | End: 2022-11-23

## 2022-11-23 RX ORDER — HYDROCORTISONE 25 MG/G
CREAM TOPICAL 3 TIMES DAILY PRN
Status: DISCONTINUED | OUTPATIENT
Start: 2022-11-23 | End: 2022-11-25 | Stop reason: HOSPADM

## 2022-11-23 RX ORDER — MISOPROSTOL 200 UG/1
800 TABLET ORAL AS NEEDED
Status: DISCONTINUED | OUTPATIENT
Start: 2022-11-23 | End: 2022-11-23 | Stop reason: HOSPADM

## 2022-11-23 RX ORDER — KETOROLAC TROMETHAMINE 15 MG/ML
15 INJECTION, SOLUTION INTRAMUSCULAR; INTRAVENOUS EVERY 6 HOURS
Status: DISPENSED | OUTPATIENT
Start: 2022-11-23 | End: 2022-11-24

## 2022-11-23 RX ORDER — IBUPROFEN 800 MG/1
800 TABLET ORAL EVERY 8 HOURS
Status: DISCONTINUED | OUTPATIENT
Start: 2022-11-23 | End: 2022-11-23

## 2022-11-23 RX ORDER — ONDANSETRON 2 MG/ML
4 INJECTION INTRAMUSCULAR; INTRAVENOUS EVERY 6 HOURS PRN
Status: DISCONTINUED | OUTPATIENT
Start: 2022-11-23 | End: 2022-11-25 | Stop reason: HOSPADM

## 2022-11-23 RX ORDER — METHYLERGONOVINE MALEATE 0.2 MG/ML
200 INJECTION INTRAVENOUS ONCE AS NEEDED
Status: DISCONTINUED | OUTPATIENT
Start: 2022-11-23 | End: 2022-11-23 | Stop reason: HOSPADM

## 2022-11-23 RX ORDER — OXYTOCIN/0.9 % SODIUM CHLORIDE 30/500 ML
2 PLASTIC BAG, INJECTION (ML) INTRAVENOUS
Status: DISCONTINUED | OUTPATIENT
Start: 2022-11-23 | End: 2022-11-23

## 2022-11-23 RX ORDER — METHYLERGONOVINE MALEATE 0.2 MG/ML
200 INJECTION INTRAVENOUS ONCE AS NEEDED
Status: DISCONTINUED | OUTPATIENT
Start: 2022-11-23 | End: 2022-11-25 | Stop reason: HOSPADM

## 2022-11-23 RX ORDER — IBUPROFEN 600 MG/1
600 TABLET ORAL EVERY 6 HOURS
Status: DISCONTINUED | OUTPATIENT
Start: 2022-11-24 | End: 2022-11-25 | Stop reason: HOSPADM

## 2022-11-23 RX ORDER — OXYCODONE HYDROCHLORIDE 10 MG/1
10 TABLET ORAL EVERY 4 HOURS PRN
Status: DISCONTINUED | OUTPATIENT
Start: 2022-11-23 | End: 2022-11-25 | Stop reason: HOSPADM

## 2022-11-23 RX ORDER — NIFEDIPINE 10 MG/1
10-20 CAPSULE ORAL
Status: DISCONTINUED | OUTPATIENT
Start: 2022-11-23 | End: 2022-11-25 | Stop reason: HOSPADM

## 2022-11-23 RX ORDER — MAGNESIUM SULFATE HEPTAHYDRATE 40 MG/ML
2 INJECTION, SOLUTION INTRAVENOUS CONTINUOUS
Status: DISCONTINUED | OUTPATIENT
Start: 2022-11-23 | End: 2022-11-25 | Stop reason: HOSPADM

## 2022-11-23 RX ORDER — ACETAMINOPHEN 325 MG/1
650 TABLET ORAL EVERY 6 HOURS
Status: DISCONTINUED | OUTPATIENT
Start: 2022-11-24 | End: 2022-11-23

## 2022-11-23 RX ORDER — BUPIVACAINE HCL/0.9 % NACL/PF 0.1 %
2 PLASTIC BAG, INJECTION (ML) EPIDURAL ONCE
Status: COMPLETED | OUTPATIENT
Start: 2022-11-23 | End: 2022-11-23

## 2022-11-23 RX ORDER — POLYETHYLENE GLYCOL 3350 17 G/17G
17 POWDER, FOR SOLUTION ORAL DAILY
Status: DISCONTINUED | OUTPATIENT
Start: 2022-11-23 | End: 2022-11-25 | Stop reason: HOSPADM

## 2022-11-23 RX ORDER — FENTANYL CITRATE 50 UG/ML
INJECTION, SOLUTION INTRAMUSCULAR; INTRAVENOUS AS NEEDED
Status: DISCONTINUED | OUTPATIENT
Start: 2022-11-23 | End: 2022-11-23 | Stop reason: SURG

## 2022-11-23 RX ORDER — LIDOCAINE HYDROCHLORIDE AND EPINEPHRINE 20; 5 MG/ML; UG/ML
INJECTION, SOLUTION EPIDURAL; INFILTRATION; INTRACAUDAL; PERINEURAL AS NEEDED
Status: DISCONTINUED | OUTPATIENT
Start: 2022-11-23 | End: 2022-11-23 | Stop reason: SURG

## 2022-11-23 RX ORDER — BUPIVACAINE HYDROCHLORIDE 2.5 MG/ML
INJECTION, SOLUTION EPIDURAL; INFILTRATION; INTRACAUDAL AS NEEDED
Status: DISCONTINUED | OUTPATIENT
Start: 2022-11-23 | End: 2022-11-23 | Stop reason: SURG

## 2022-11-23 RX ORDER — ONDANSETRON 2 MG/ML
INJECTION INTRAMUSCULAR; INTRAVENOUS AS NEEDED
Status: DISCONTINUED | OUTPATIENT
Start: 2022-11-23 | End: 2022-11-23 | Stop reason: SURG

## 2022-11-23 RX ADMIN — SIMETHICONE 80 MG: 80 TABLET, CHEWABLE ORAL at 18:29

## 2022-11-23 RX ADMIN — Medication 3 MG: at 10:13

## 2022-11-23 RX ADMIN — FENTANYL CITRATE 100 MCG: 50 INJECTION, SOLUTION INTRAMUSCULAR; INTRAVENOUS at 09:35

## 2022-11-23 RX ADMIN — CEFAZOLIN 2 G: 10 INJECTION, POWDER, FOR SOLUTION INTRAVENOUS at 19:08

## 2022-11-23 RX ADMIN — ONDANSETRON 8 MG: 2 INJECTION INTRAMUSCULAR; INTRAVENOUS at 09:49

## 2022-11-23 RX ADMIN — ACETAMINOPHEN 1000 MG: 500 TABLET ORAL at 12:53

## 2022-11-23 RX ADMIN — KETOROLAC TROMETHAMINE 30 MG: 30 INJECTION, SOLUTION INTRAMUSCULAR at 10:53

## 2022-11-23 RX ADMIN — SODIUM CHLORIDE, POTASSIUM CHLORIDE, SODIUM LACTATE AND CALCIUM CHLORIDE 125 ML/HR: 600; 310; 30; 20 INJECTION, SOLUTION INTRAVENOUS at 06:33

## 2022-11-23 RX ADMIN — BUPIVACAINE HYDROCHLORIDE 7 ML: 2.5 INJECTION, SOLUTION EPIDURAL; INFILTRATION; INTRACAUDAL; PERINEURAL at 07:35

## 2022-11-23 RX ADMIN — LABETALOL HYDROCHLORIDE 40 MG: 5 INJECTION, SOLUTION INTRAVENOUS at 08:31

## 2022-11-23 RX ADMIN — LIDOCAINE HYDROCHLORIDE AND EPINEPHRINE 5 ML: 20; 5 INJECTION, SOLUTION EPIDURAL; INFILTRATION; INTRACAUDAL; PERINEURAL at 09:35

## 2022-11-23 RX ADMIN — PROPOFOL 100 MG: 10 INJECTION, EMULSION INTRAVENOUS at 09:37

## 2022-11-23 RX ADMIN — OXYTOCIN-SODIUM CHLORIDE 0.9% IV SOLN 30 UNIT/500ML 2 MILLI-UNITS/MIN: 30-0.9/5 SOLUTION at 01:54

## 2022-11-23 RX ADMIN — MAGNESIUM SULFATE IN WATER 2 G/HR: 40 INJECTION, SOLUTION INTRAVENOUS at 17:59

## 2022-11-23 RX ADMIN — AZITHROMYCIN DIHYDRATE 500 MG: 500 INJECTION, POWDER, LYOPHILIZED, FOR SOLUTION INTRAVENOUS at 11:24

## 2022-11-23 RX ADMIN — KETOROLAC TROMETHAMINE 15 MG: 15 INJECTION, SOLUTION INTRAMUSCULAR; INTRAVENOUS at 18:27

## 2022-11-23 RX ADMIN — LABETALOL HYDROCHLORIDE 20 MG: 5 INJECTION, SOLUTION INTRAVENOUS at 08:04

## 2022-11-23 RX ADMIN — LIDOCAINE HYDROCHLORIDE AND EPINEPHRINE 10 ML: 20; 5 INJECTION, SOLUTION EPIDURAL; INFILTRATION; INTRACAUDAL; PERINEURAL at 09:32

## 2022-11-23 RX ADMIN — SODIUM CHLORIDE, POTASSIUM CHLORIDE, SODIUM LACTATE AND CALCIUM CHLORIDE: 600; 310; 30; 20 INJECTION, SOLUTION INTRAVENOUS at 10:24

## 2022-11-23 RX ADMIN — CEFAZOLIN SODIUM 2 G: 1 INJECTION, POWDER, FOR SOLUTION INTRAMUSCULAR; INTRAVENOUS at 09:43

## 2022-11-23 NOTE — L&D DELIVERY NOTE
[facility]   Primary Low Transverse  Delivery Note    Patient Name: Sandra Botello  : 1995  MRN: [id number]    Date of Delivery: 2022     Diagnosis     Pre & Post-Delivery:  Intrauterine pregnancy at 39w1d  Labor status: Induction of labor     Encounter for elective induction of labor    Category II fetal heart rate tracing during labor and delivery    Status post primary low transverse  section             Problem List    Transfer to Postpartum     Review the Delivery Report for details.     Delivery     Delivery: , Low Transverse     YOB: 2020    Time of Birth:  Gestational Age 9:39 AM   39w1d     Anesthesia: Epidural     Delivering clinician:     Forceps?   No   Vacuum? No    Shoulder dystocia present: No        Delivery narrative:  See operative report      Infant     Findings: male  infant     Infant observations: Weight: 3230 g (7 lb 1.9 oz)   Length: 20  in  Observations/Comments:        Apgars: 7  @ 1 minute /    8  @ 5 minutes   Infant Name: Darci Field     Placenta & Cord         Placenta delivered  Manual removal  at   2022  9:40 AM     Cord: 3 vessels  present.   Nuchal Cord?  no   Cord blood obtained: Yes    Cord gases obtained:  Yes    Cord gas results: Venous:  No results found for: PHCVEN    Arterial:  No results found for: PHCART     Repair      Episiotomy: Not recorded     No    Lacerations: No   Estimated Blood Loss:       Quantitative Blood Loss:          Complications     Suspected placental abruption, Nonreassuring fetal status    Disposition     Mother to Remain in LD  in stable condition currently due to preeclampsia with severe features.  Baby to remains with mom  in stable condition currently.    Edith Olivo DO  22  10:24 CST

## 2022-11-23 NOTE — ANESTHESIA PROCEDURE NOTES
Labor Epidural      Patient reassessed immediately prior to procedure    Patient location during procedure: OB  Performed By  CRNA/SANDRA: Bishnu Witt CRNA  Preanesthetic Checklist  Completed: patient identified, IV checked, site marked, risks and benefits discussed, surgical consent, monitors and equipment checked, pre-op evaluation and timeout performed  Prep:  Pt Position:sitting  Sterile Tech:gloves, cap, gown, mask and sterile barrier  Prep:DuraPrep  Monitoring:blood pressure monitoring and continuous pulse oximetry  Epidural Block Procedure:  Approach:midline  Guidance:landmark technique and palpation technique  Location:L3-L4  Needle Type:Tuohy  Needle Gauge:17 G  Loss of Resistance Medium: saline  Loss of Resistance: 8cm  Cath Depth at skin:13 cm  Paresthesia: none  Aspiration:negative  Test Dose:negative  Number of Attempts: 1  Post Assessment:  Dressing:occlusive dressing applied and secured with tape  Pt Tolerance:patient tolerated the procedure well with no apparent complications  Complications:no

## 2022-11-23 NOTE — ANESTHESIA POSTPROCEDURE EVALUATION
Patient: Sandra Botello    Procedure Summary     Date: 22 Room / Location: [facility] LABOR DELIVERY    Anesthesia Start:  Anesthesia Stop: 22 1053    Procedure:  SECTION PRIMARY (Abdomen) Diagnosis: (Fetal Intolerance of Labor)    Surgeons: Edith Olivo DO Provider: Bishnu Witt CRNA    Anesthesia Type: epidural ASA Status: 2          Anesthesia Type: epidural    Vitals  Vitals Value Taken Time   /58 22 1050   Temp 98.4 °F (36.9 °C) 22 0633   Pulse 75 22 1050   Resp 18 22 0533   SpO2 95 % 22 1049   Vitals shown include unvalidated device data.        Post Anesthesia Care and Evaluation    Patient location during evaluation: bedside  Patient participation: complete - patient participated  Level of consciousness: awake and alert  Pain score: 0  Pain management: adequate    Airway patency: patent  Anesthetic complications: No anesthetic complications  PONV Status: none  Cardiovascular status: acceptable  Respiratory status: acceptable  Hydration status: acceptable  Post Neuraxial Block status: No signs or symptoms of PDPH  Comments: HR 80, 108/58, RR 14, 98%  No anesthesia care post op

## 2022-11-23 NOTE — PLAN OF CARE
Goal Outcome Evaluation:  Plan of Care Reviewed With: patient, spouse        Progress: improving  Outcome Evaluation: VSS; SVE 2. cytotec given orally x 2 doses.  Patient had 1 dose Stadol 1 mg for pain.  ambulating to bathroom.  voiding.

## 2022-11-23 NOTE — ANESTHESIA PREPROCEDURE EVALUATION
Anesthesia Evaluation     NPO Solid Status: > 8 hours  NPO Liquid Status: < 2 hours           Airway   Mallampati: II  TM distance: >3 FB  Neck ROM: full  no difficulty expected  Dental - normal exam     Pulmonary - normal exam   Cardiovascular - normal exam        Neuro/Psych  GI/Hepatic/Renal/Endo    (+) obesity,       Musculoskeletal     Abdominal    Substance History      OB/GYN    (+) Pregnant,         Other                        Anesthesia Plan    ASA 2     epidural       Anesthetic plan, risks, benefits, and alternatives have been provided, discussed and informed consent has been obtained with: patient.        CODE STATUS:    Code Status (Patient has no pulse and is not breathing): CPR (Attempt to Resuscitate)  Medical Interventions (Patient has pulse or is breathing): Full

## 2022-11-23 NOTE — PLAN OF CARE
Problem: Adult Inpatient Plan of Care  Goal: Plan of Care Review  Outcome: Ongoing, Progressing  Goal: Patient-Specific Goal (Individualized)  Outcome: Ongoing, Progressing  Goal: Absence of Hospital-Acquired Illness or Injury  Outcome: Ongoing, Progressing  Intervention: Prevent Skin Injury  Recent Flowsheet Documentation  Taken 2022 1050 by Jodee Sanford RN  Body Position: sitting up in bed  Intervention: Prevent and Manage VTE (Venous Thromboembolism) Risk  Recent Flowsheet Documentation  Taken 2022 1050 by Jodee Sanford RN  Activity Management: bedrest  VTE Prevention/Management:   bilateral   sequential compression devices on  Taken 2022 0705 by Jodee Sanford RN  VTE Prevention/Management:   bilateral   patient refused intervention  Intervention: Prevent Infection  Recent Flowsheet Documentation  Taken 2022 1050 by Jodee Sanford RN  Infection Prevention:   visitors restricted/screened   hand hygiene promoted  Goal: Optimal Comfort and Wellbeing  Outcome: Ongoing, Progressing  Intervention: Provide Person-Centered Care  Recent Flowsheet Documentation  Taken 2022 1050 by Jodee Sanford RN  Trust Relationship/Rapport:   care explained   questions encouraged   questions answered  Taken 2022 0705 by Jodee Sanford RN  Trust Relationship/Rapport:   care explained   questions encouraged   questions answered  Goal: Readiness for Transition of Care  Outcome: Ongoing, Progressing     Problem:  Fall Injury Risk  Goal: Absence of Fall, Infant Drop and Related Injury  Outcome: Ongoing, Progressing     Problem: Bleeding ( Delivery)  Goal: Bleeding is Controlled  Outcome: Ongoing, Progressing     Problem: Change in Fetal Wellbeing ( Delivery)  Goal: Stable Fetal Wellbeing  Outcome: Ongoing, Progressing  Intervention: Promote and Monitor Fetal Wellbeing  Recent Flowsheet Documentation  Taken 2022 1050 by  Jodee Sanford RN  Body Position: sitting up in bed     Problem: Infection ( Delivery)  Goal: Absence of Infection Signs and Symptoms  Outcome: Ongoing, Progressing  Intervention: Minimize Infection Risk  Recent Flowsheet Documentation  Taken 2022 1050 by Jodee Sanford RN  Infection Prevention:   visitors restricted/screened   hand hygiene promoted     Problem: Respiratory Compromise ( Delivery)  Goal: Effective Oxygenation and Ventilation  Outcome: Ongoing, Progressing     Problem: Device-Related Complication Risk (Anesthesia/Analgesia, Neuraxial)  Goal: Safe Infusion Delivery Completion  Outcome: Ongoing, Progressing     Problem: Infection (Anesthesia/Analgesia, Neuraxial)  Goal: Absence of Infection Signs and Symptoms  Outcome: Ongoing, Progressing  Intervention: Prevent or Manage Infection  Recent Flowsheet Documentation  Taken 2022 1050 by Jodee Sanford RN  Infection Prevention:   visitors restricted/screened   hand hygiene promoted     Problem: Nausea and Vomiting (Anesthesia/Analgesia, Neuraxial)  Goal: Nausea and Vomiting Relief  Outcome: Ongoing, Progressing     Problem: Pain (Anesthesia/Analgesia, Neuraxial)  Goal: Effective Pain Control  Outcome: Ongoing, Progressing  Intervention: Prevent or Manage Pain  Recent Flowsheet Documentation  Taken 2022 1050 by Jodee Sanford RN  Diversional Activities:   smartphone   television  Taken 2022 0705 by Jodee Sanford RN  Diversional Activities:   television   smartphone     Problem: Respiratory Compromise (Anesthesia/Analgesia, Neuraxial)  Goal: Effective Oxygenation and Ventilation  Outcome: Ongoing, Progressing     Problem: Sensorimotor Impairment (Anesthesia/Analgesia, Neuraxial)  Goal: Baseline Motor Function  Outcome: Ongoing, Progressing     Problem: Urinary Retention (Anesthesia/Analgesia, Neuraxial)  Goal: Effective Urinary Elimination  Outcome: Ongoing, Progressing   Goal Outcome  Evaluation:                 Problem: Change in Fetal Wellbeing (Labor)  Goal: Stable Fetal Wellbeing  Outcome: Unable to Meet, Plan Revised     Problem: Delayed Labor Progression (Labor)  Goal: Effective Progression to Delivery  Outcome: Unable to Meet, Plan Revised

## 2022-11-23 NOTE — INTERVAL H&P NOTE
26 yo  at 39+0 presenting for elective IOL. Intermittent BH ctx, not regular. No VB or LOF. Good FM.    Admit vitals  /88  HR 73  O2 96  RR 18  Temp 98.1F  Gen: well appearing, NAD  CV: RRR  Chest: no increased work of breathing  Abd: soft, nontender, gravid    26 yo  at 39+0 presenting for elective IOL.  1.5 cm on admission, will start with Cytotec for IOL, CRB pending cervical change  Discussed admission process, desires to proceed  See full H&P        This document has been electronically signed by Edith Olivo DO on 2022 20:21 CST

## 2022-11-23 NOTE — PROGRESS NOTES
To patient room for evaluation.    After discussion of r/b AROM clear fluid. Tolerated well. Ctx q1-2 mins, 140's, accels present, decels absent. Still on pitocin 8 miU.     CBC and CMP. Intermittent mild range and normotensive. Did have severe range BP. Will continue to monitor closely. If persistent will need mag sulfate.

## 2022-11-23 NOTE — L&D DELIVERY NOTE
[facility]  Operative Report    Name: Sandra Botello  MRN: [id number]  Date: 2022  CSN: [id number]      Location: [facility] LABOR DELIVERY    Service: Obstetrics    Pre-op Diagnosis: Pregnancy at 30 weeks and 1 days gestation, induction of labor elective, preeclampsia severe features, nonreassuring fetal status with fetal bradycardia    Post-op Diagnosis: Same, status post primary low-transverse  section    Surgeon: Edith Olivo DO    Assistant: Linda Arevalo, CST was responsible for performing the following activities: Retraction, Suction, Irrigation, Closing, Placing Dressing and Delivery of Fetus and their skilled assistance was necessary for the success of this case.    Staff:  Circulator: Jodee Sanford RN  Scrub Person: Melida Joy & LAVERNE Nurse: Bryanna Lee RN; Zaida Scott RN; Annette Whitmore RN  Assistant: Linda Arevalo CSFA    Anesthesia: Epidural    Anesthesia Staff:  Anesthesiologist: Troy Perez MD  CRNA: Bishnu Witt CRNA  Student Nurse Anesthetist: Eun Phipps SRNA    Operation: Primary low-transverse  section, CEHLSEY block    Drains: Mohan    Complications: Fetal bradycardia requiring an emergent primary low-transverse  section    Findings: Normal-appearing uterus, bilateral fallopian tubes, bilateral ovaries; moderate amount of clot with removal of the fetus and placenta suggesting a placental abruption; midline hysterotomy extension measuring approximately 4 cm inferiorly    Condition: Stable    Specimens/Disposition: Placenta, cord blood, cord gases    Quantitative Blood Loss:826 mL  IV Fluids: 600 mL  Urine Output: 250 mL    Indications: Sandra Botello, 27 y.o., G 2 P 0010 in labor with elective induction of labor, progressing well, developed preeclampsia severe features during labor, development of fetal bradycardia without improvement with conservative management, to operating room for emergent  low-transverse  section.    Description of Operation:  The patient was identified and the procedure verified as a  section delivery.  The patient had spinal and was given Propofol after dosing up to assist with anesthesia.  Patient prepared with Betadine splash and quickly draped in normal sterile fashion in dorsal supine position with a leftward tilt.  A transverse skin incision was made with the scalpel and carried down through the subcutaneous tissue to the fascia using the Bovie.  Fascial incision was made with the Bovie and extended bluntly.  The rectus muscle was then  in the midline and the peritoneum was identified and entered bluntly.  The peritoneal incision was extended transversely.  Bladder blade and retractor were inserted.  The uterovesical peritoneal reflection was identified.  A low transverse uterine incision was made with a scalpel and the uterine incision was extended with upward traction.  The amniotic sac was ruptured previously.  Delivered from cephalic presentation was a live male  weighing 3230 grams with Apgar scores of 7 at one minute and 8 at five minutes.  Cord clamped and cut and infant with bulb suction were handed to awaiting staff.  Cord blood was obtained and sent.  Cord gas was obtained and sent. Moderate dark clot noted after delivery of infant and with delivery of placenta suggesting placental abruption.    The placenta was removed intact and appeared normal.  Uterus exteriorized and cleared of all clots and debris.  The uterus, tubes and ovaries appeared normal.  The midline hysterotomy extension inferiorly was repaired in two layers with 0-Monocryl, taking care to avoid the bladder, which was later backfilled with sterile mild, noting no injury to the bladder and no involvement in hysterotomy closure. The uterine incision was closed with running locked sutures of 0-Monocryl, a second layer of the same was used to imbricate the incision.      Posterior cul-de-sac was cleared.  Uterus was reinserted atraumatically.  Hemostasis was observed.  Bilateral paracolic gutters cleared.  Inspection of the abdomen and pelvis prior to abdominal wall closure revealed no evidence of retained instruments or sponges.  The hysterotomy was again examined and noted to be hemostatic. A CHELSEY block was then placed with 30 cc of Ropivacaine at the superior fascial edge divided between the left and right. The fascia was then reapproximated with running sutures of 0-Vicryl.  Subcutaneous layer closed with 2-0 plain gut.  The skin was reapproximated with 3-0 Monocryl in subcuticular fashion.  Prineo dressing applied.      Sponge, needle and instrument counts were correct x2.  There were no intraoperative complications and patient tolerated the procedure well.  The patient was escorted to her room in stable condition.      The patient received cefazolin 2 g for antibiotic prophylaxis prior to the start of the procedure.  Plan for azithromycin 500 mg as well postpartum and 1 additional dose of Cefazolin due to Betadine splash.        This document has been electronically signed by Edith Olivo DO on November 23, 2022 10:26 CST

## 2022-11-23 NOTE — PLAN OF CARE
Problem: Adult Inpatient Plan of Care  Goal: Plan of Care Review  Outcome: Ongoing, Progressing  Flowsheets  Taken 11/23/2022 0700 by Cassie Hope RN  Progress: improving  Outcome Evaluation: VSS, SVE 4, stadol given, one dose of cytotec given, pitocin started and running at 8, epidural in place, pain controlled  Taken 11/22/2022 1758 by Negrita Chery RN  Plan of Care Reviewed With:   patient   spouse  Goal: Patient-Specific Goal (Individualized)  Outcome: Ongoing, Progressing  Goal: Absence of Hospital-Acquired Illness or Injury  Outcome: Ongoing, Progressing  Intervention: Identify and Manage Fall Risk  Recent Flowsheet Documentation  Taken 11/22/2022 2026 by Cassie Hope RN  Safety Promotion/Fall Prevention:   safety round/check completed   assistive device/personal items within reach   clutter free environment maintained  Intervention: Prevent Skin Injury  Recent Flowsheet Documentation  Taken 11/22/2022 2026 by Cassie Hope RN  Body Position:   position changed independently   side-lying   right  Intervention: Prevent and Manage VTE (Venous Thromboembolism) Risk  Recent Flowsheet Documentation  Taken 11/22/2022 2026 by Cassie Hope RN  Activity Management: activity adjusted per tolerance  VTE Prevention/Management: patient refused intervention  Intervention: Prevent Infection  Recent Flowsheet Documentation  Taken 11/22/2022 2026 by Cassie Hope RN  Infection Prevention:   visitors restricted/screened   rest/sleep promoted  Goal: Optimal Comfort and Wellbeing  Outcome: Ongoing, Progressing  Intervention: Monitor Pain and Promote Comfort  Recent Flowsheet Documentation  Taken 11/22/2022 1930 by Cassie Hope RN  Pain Management Interventions: simple massage provided  Intervention: Provide Person-Centered Care  Recent Flowsheet Documentation  Taken 11/22/2022 2026 by Cassie Hope RN  Trust Relationship/Rapport:   care explained   choices provided   emotional support provided    empathic listening provided   questions answered   questions encouraged   reassurance provided   thoughts/feelings acknowledged  Goal: Readiness for Transition of Care  Outcome: Ongoing, Progressing     Problem: Bleeding (Labor)  Goal: Hemostasis  Outcome: Ongoing, Progressing     Problem: Change in Fetal Wellbeing (Labor)  Goal: Stable Fetal Wellbeing  Outcome: Ongoing, Progressing  Intervention: Promote and Monitor Fetal Wellbeing  Recent Flowsheet Documentation  Taken 2022 by Cassie Hope RN  Body Position:   position changed independently   side-lying   right     Problem: Delayed Labor Progression (Labor)  Goal: Effective Progression to Delivery  Outcome: Ongoing, Progressing     Problem: Infection (Labor)  Goal: Absence of Infection Signs and Symptoms  Outcome: Ongoing, Progressing  Intervention: Prevent or Manage Infection  Recent Flowsheet Documentation  Taken 2022 by Cassie Hope RN  Infection Prevention:   visitors restricted/screened   rest/sleep promoted     Problem: Labor Pain (Labor)  Goal: Acceptable Pain Control  Outcome: Ongoing, Progressing     Problem: Uterine Tachysystole (Labor)  Goal: Normal Uterine Contraction Pattern  Outcome: Ongoing, Progressing     Problem:  Fall Injury Risk  Goal: Absence of Fall, Infant Drop and Related Injury  Outcome: Ongoing, Progressing  Intervention: Identify and Manage Contributors  Recent Flowsheet Documentation  Taken 2022 by Cassie Hope RN  Medication Review/Management: medications reviewed  Intervention: Promote Injury-Free Environment  Recent Flowsheet Documentation  Taken 2022 by Cassie Hope RN  Safety Promotion/Fall Prevention:   safety round/check completed   assistive device/personal items within reach   clutter free environment maintained   Goal Outcome Evaluation:           Progress: improving  Outcome Evaluation: VSS, SVE 4, stadol given, one dose of cytotec given, pitocin started and  running at 8, epidural in place, pain controlled

## 2022-11-23 NOTE — PLAN OF CARE
Problem: Bleeding (Labor)  Goal: Hemostasis  Outcome: Met     Problem: Infection (Labor)  Goal: Absence of Infection Signs and Symptoms  Outcome: Met  Intervention: Prevent or Manage Infection  Recent Flowsheet Documentation  Taken 11/23/2022 1050 by Jodee Sanford RN  Infection Prevention:   visitors restricted/screened   hand hygiene promoted     Problem: Labor Pain (Labor)  Goal: Acceptable Pain Control  Outcome: Met     Problem: Uterine Tachysystole (Labor)  Goal: Normal Uterine Contraction Pattern  Outcome: Met   Goal Outcome Evaluation:                 Problem: Change in Fetal Wellbeing (Labor)  Goal: Stable Fetal Wellbeing  Outcome: Unable to Meet, Plan Revised     Problem: Delayed Labor Progression (Labor)  Goal: Effective Progression to Delivery  Outcome: Unable to Meet, Plan Revised

## 2022-11-23 NOTE — PAYOR COMM NOTE
"    [facility]  Case Managment Extender   Sheri Winn  (P) [phone]  (F) [phone]      REF# [id number]  Sandra Botello (27 y.o. Female)     Date of Birth   1995    Social Security Number       Address   [address] [place] [address]    Home Phone   [phone]    MRN   [id number]       Jew   Faith    Marital Status   Single                            Admission Date   22    Admission Type   Elective    Admitting Provider   Edith Olivo DO    Attending Provider   Edith Olivo DO    Department, Room/Bed   [facility] LABOR DELIVERY, L771/       Discharge Date       Discharge Disposition       Discharge Destination                               Attending Provider: Edith Olivo DO    Allergies: No Known Allergies    Isolation: None   Infection: None   Code Status: CPR    Ht: 165.1 cm (65\")   Wt: 98.3 kg (216 lb 12.8 oz)    Admission Cmt: None   Principal Problem: Encounter for elective induction of labor [Z34.90]                 Active Insurance as of 2022     Primary Coverage     Payor Plan Insurance Group Employer/Plan Group    ANTHEM MEDICAID [place] MEDICAID [id number]     Payor Plan Address Payor Plan Phone Number Payor Plan Fax Number Effective Dates    [address] [address] [phone]  2021 - None Entered    [facility] [id number]       Subscriber Name Subscriber Birth Date Member ID       SANDRA BOTELLO 1995 [id number]                 Emergency Contacts      (Rel.) Home Phone Work Phone Mobile Phone    Lynn Botello (Mother) [phone] -- --               History & Physical      Edith Olivo DO at 22 0800        28 yo  at 39+0 presenting for elective IOL. Intermittent BH ctx, not regular. No VB or LOF. Good FM.    Admit vitals  /88  HR 73  O2 96  RR 18  Temp 98.1F  Gen: well appearing, NAD  CV: RRR  Chest: no increased work of breathing  Abd: " soft, nontender, gravid    28 yo  at 39+0 presenting for elective IOL.  1.5 cm on admission, will start with Cytotec for IOL, CRB pending cervical change  Discussed admission process, desires to proceed  See full H&P        This document has been electronically signed by Edith Olivo DO on 2022 20:21 CST      Electronically signed by Edith Olivo DO at 22   Source Note          [facility]YS[facility]    Name: Sandra Botello  MRN: [id number]  Location: Room/bed info not found  Date: 11/15/2022  CSN: [id number]      CHIEF COMPLAINT:  Routine PNC, scheduled EIOL    HISTORY OF PRESENT ILLNESS  Sandra Botello is a 27 y.o.  at 38w0d gestational age by LMP =1 TRI US suggesting Estimated Date of Delivery: 22.  The patient presents for routine PNC, scheduled for EIOL.  Denies LOF.  Denies  vaginal bleeding.  Denies regular painful contractions, occasional cramping.  Good FM.    Patient denies any chest pain, palpitations, headaches, lightheadedness, shortness of breath, cough, nausea, vomiting, diarrhea, constipation, fever, or chills.    ROS  Review of Systems   Constitutional: Negative.    HENT: Negative.    Respiratory: Negative.    Cardiovascular: Negative.    Gastrointestinal: Negative.    Genitourinary: Negative.    Musculoskeletal: Negative.    Skin: Negative.    Psychiatric/Behavioral: Negative.        PRENATAL LAB RESULTS  Prenatal labs reviewed  External Prenatal Results     Pregnancy Outside Results - Transcribed From Office Records - See Scanned Records For Details     Test Value Date Time    ABO  O  22    Rh  Positive  22    Antibody Screen  Negative  22    Varicella IgG       Rubella  1.84 index 22 08    Hgb  11.6 g/dL 22       13.1 g/dL 22    Hct  32.9 % 22       37.0 % 22 08    Glucose Fasting GTT       Glucose Tolerance Test 1 hour        Glucose Tolerance Test 3 hour       Gonorrhea (discrete)  Negative  04/05/22 0856    Chlamydia (discrete)  Negative  04/05/22 0856    RPR  Non-Reactive  04/05/22 0856    VDRL       Syphilis Antibody       HBsAg  Non-Reactive  04/05/22 0856    Herpes Simplex Virus PCR       Herpes Simplex VIrus Culture  Positive  07/09/21 0930    HIV  Non-Reactive  04/05/22 0856    Hep C RNA Quant PCR       Hep C Antibody  Non-Reactive  04/05/22 0856    AFP       Group B Strep  Negative  11/01/22 0909    GBS Susceptibility to Clindamycin       GBS Susceptibility to Erythromycin       Fetal Fibronectin       Genetic Testing, Maternal Blood             Drug Screening     Test Value Date Time    Urine Drug Screen       Amphetamine Screen  Negative  04/05/22 0856    Barbiturate Screen  Negative  04/05/22 0856    Benzodiazepine Screen  Negative  04/05/22 0856    Methadone Screen  Negative  04/05/22 0856    Phencyclidine Screen  Negative  04/05/22 0856    Opiates Screen  Negative  04/05/22 0856    THC Screen  Negative  04/05/22 0856    Cocaine Screen       Propoxyphene Screen  Negative  04/05/22 0856    Buprenorphine Screen  Negative  04/05/22 0856    Methamphetamine Screen       Oxycodone Screen  Negative  04/05/22 0856    Tricyclic Antidepressants Screen  Negative  04/05/22 0856          Legend    ^: Historical                        PRENATAL RISK FACTORS  4/22 Problems (from 04/05/22 to present)     Problem Noted Resolved    Elevated blood pressure reading without diagnosis of hypertension 9/6/2022 by Edith Olivo DO No    COVID-19 virus detected 6/21/2022 by Edith Olivo DO No    Overview Addendum 11/1/2022 10:08 AM by Edith Olivo DO     Status post COVID in early 2nd trimester. Mild symptoms, no hospitalization.   Consider BPPs, serial growth US in pregnancy    Opted for weekly BPP for monitoring, discussed r/b.         History of herpes genitalis 4/24/2022 by Edith Olivo DO No    Overview Signed 4/24/2022  5:23 PM by  Edith Olivo DO     Plan for suppression at 35-36 weeks         Chronic constipation 2022 by Edith Olivo DO No    Overview Signed 2022  5:25 PM by Edith Olivo DO     Chronic constipation, internal hemorrhoids prior to pregnancy  Continue stool softener prn, may need to add Miralax         Supervision of other normal pregnancy, antepartum 2022 by Edith Olivo DO No    Overview Addendum 2022  5:10 PM by Edith Olivo DO     H/o spontaneous sab x1, early  Rh+  RI  IOB Hgb 13.1, Plt 206  NIPS: Low risk male  HSV-needs suppression 35-36 weeks         Urinary tract infection in mother during pregnancy, antepartum 2022 by Edith Olivo DO 2022 by Edith Olivo DO    Overview Signed 2022  5:08 PM by Edith Olivo DO     Repeat urine culture negative               DATING SUMMARY  LMP (22) -- HEAVENLY 22  1 TRI US (22 at 8w3d) -- HEAVENLY 22    OB HISTORY  OB History    Para Term  AB Living   2 0 0 0 1 0   SAB IAB Ectopic Molar Multiple Live Births   1 0 0 0 0 0      # Outcome Date GA Lbr Lux/2nd Weight Sex Delivery Anes PTL Lv   2 Current            1 SAB 2021             GYN HISTORY  Denies h/o sexually transmitted infections/pelvic inflammatory disease  Denies h/o abnormal pap smears, last pap was 9/10/21 NIL, absent transformation zone, consider repeat PP  Denies h/o gynecologic surgeries, including biopsies of the cervix    PAST MEDICAL HISTORY  Past Medical History:   Diagnosis Date   • Herpes    • Urinary tract infection in mother during pregnancy, antepartum 2022     PAST SURGICAL HISTORY  No past surgical history on file.  FAMILY HISTORY  Family History   Problem Relation Age of Onset   • Melanoma Mother    • Hypertension Father    • No Known Problems Sister    • No Known Problems Brother    • No Known Problems Maternal Grandmother    • Cancer Maternal Grandfather    • Thyroid disease Maternal Grandfather    • Hypertension Paternal  Grandmother    • Diabetes Paternal Grandmother    • Depression Paternal Grandmother      SOCIAL HISTORY  Social History     Socioeconomic History   • Marital status: Single   Tobacco Use   • Smoking status: Never   • Smokeless tobacco: Never   Vaping Use   • Vaping Use: Never used   Substance and Sexual Activity   • Alcohol use: No   • Drug use: No   • Sexual activity: Yes     Partners: Male     Birth control/protection: None     Comment: last pap smear negative on 9/10/21     ALLERGIES  No Known Allergies  HOME MEDICATIONS  Prior to Admission medications    Medication Sig Start Date End Date Taking? Authorizing Provider   docusate sodium (Colace) 100 MG capsule Take 1 capsule by mouth 2 (Two) Times a Day. 3/8/22  Yes Zaida Farah APRN   hydrocortisone (ANUSOL-HC) 25 MG suppository Insert 1 suppository into the rectum 2 (Two) Times a Day. 3/29/22  Yes Zaida Farah APRN   Hydrocortisone, Perianal, (ANUSOL-HC) 2.5 % rectal cream Insert  into the rectum 2 (Two) Times a Day. 3/29/22  Yes Zaida Farah APRN   prenatal vitamin (prenatal, CLASSIC, vitamin) tablet Take  by mouth Daily.   Yes Provider, MD Chris   triamcinolone (KENALOG) 0.1 % ointment  22  Yes Provider, MD Chris     PHYSICAL EXAM  /82   Wt 98.3 kg (216 lb 12.8 oz)   LMP 2022   BMI 36.08 kg/m²   General: No acute distress. Well developed, well nourished. Rosmery.  Heart: Regular rate and rhythm  Lungs: No increased work of breathing  Abdomen: Soft, nontender to palpation, enlarged by gravid uterus.    NST Review  NST on admission    SVE: 1.5/25/-3    Prelim US: cephalic, right posterior placenta, ALBINO 8.23 cm,  bpm, BPP 8/8    IMPRESSION  Sandra Botello is a 27 y.o.  at 38w0d presenting for routine PNC, scheduled for EIOL.    PLAN  1.  IUP at 39+0 on  scheduled for EIOL  - Admit: Labor and Delivery  - Attending: Dr. Olivo  - Condition: Stable  - Vitals: per protocol  - Activity: ad jenn  - Nursing:  Continuous electronic fetal monitoring, as per protocol  - Diet: Clears  - IV fluids:  mL/hr  - Meds: prn tylenol, zofran, stadol  - Allergies: NKDA  - Labs: CBC, T&S, UDS  - GBS: neg.  Antibiotics:  Not indicated  - Sandra Botello and I have discussed pain goals for this hospitalization after reviewing her current clinical condition, medical history and prior pain experiences.  The goal is to keep her pain level manageable.  Will discuss during admission if patient does desire an epidural  - If still 1.53 will likely start with cytotec for IOL, possibly with CRB  - Anticipate         This document has been electronically signed by Edith Olivo DO on 2022 21:20 CST      Electronically signed by Edith Olivo DO at 22             Edith Olivo DO at 11/15/22 8031          HISTORY & PHYSICAL - Obstetrics  [facility]    Name: Sandra Botello  MRN: [id number]  Location: Room/bed info not found  Date: 11/15/2022  CSN: [id number]      CHIEF COMPLAINT:  Routine PNC, scheduled EIOL    HISTORY OF PRESENT ILLNESS  Sandra Botello is a 27 y.o.  at 38w0d gestational age by LMP =1 TRI US suggesting Estimated Date of Delivery: 22.  The patient presents for routine PNC, scheduled for EIOL.  Denies LOF.  Denies  vaginal bleeding.  Denies regular painful contractions, occasional cramping.  Good FM.    Patient denies any chest pain, palpitations, headaches, lightheadedness, shortness of breath, cough, nausea, vomiting, diarrhea, constipation, fever, or chills.    ROS  Review of Systems   Constitutional: Negative.    HENT: Negative.    Respiratory: Negative.    Cardiovascular: Negative.    Gastrointestinal: Negative.    Genitourinary: Negative.    Musculoskeletal: Negative.    Skin: Negative.    Psychiatric/Behavioral: Negative.        PRENATAL LAB RESULTS  Prenatal labs reviewed  External Prenatal Results     Pregnancy Outside Results -  Transcribed From Office Records - See Scanned Records For Details     Test Value Date Time    ABO  O  04/05/22 0856    Rh  Positive  04/05/22 0856    Antibody Screen  Negative  04/05/22 0856    Varicella IgG       Rubella  1.84 index 04/05/22 0856    Hgb  11.6 g/dL 09/06/22 0921       13.1 g/dL 04/05/22 0856    Hct  32.9 % 09/06/22 0921       37.0 % 04/05/22 0856    Glucose Fasting GTT       Glucose Tolerance Test 1 hour       Glucose Tolerance Test 3 hour       Gonorrhea (discrete)  Negative  04/05/22 0856    Chlamydia (discrete)  Negative  04/05/22 0856    RPR  Non-Reactive  04/05/22 0856    VDRL       Syphilis Antibody       HBsAg  Non-Reactive  04/05/22 0856    Herpes Simplex Virus PCR       Herpes Simplex VIrus Culture  Positive  07/09/21 0930    HIV  Non-Reactive  04/05/22 0856    Hep C RNA Quant PCR       Hep C Antibody  Non-Reactive  04/05/22 0856    AFP       Group B Strep  Negative  11/01/22 0909    GBS Susceptibility to Clindamycin       GBS Susceptibility to Erythromycin       Fetal Fibronectin       Genetic Testing, Maternal Blood             Drug Screening     Test Value Date Time    Urine Drug Screen       Amphetamine Screen  Negative  04/05/22 0856    Barbiturate Screen  Negative  04/05/22 0856    Benzodiazepine Screen  Negative  04/05/22 0856    Methadone Screen  Negative  04/05/22 0856    Phencyclidine Screen  Negative  04/05/22 0856    Opiates Screen  Negative  04/05/22 0856    THC Screen  Negative  04/05/22 0856    Cocaine Screen       Propoxyphene Screen  Negative  04/05/22 0856    Buprenorphine Screen  Negative  04/05/22 0856    Methamphetamine Screen       Oxycodone Screen  Negative  04/05/22 0856    Tricyclic Antidepressants Screen  Negative  04/05/22 0856          Legend    ^: Historical                        PRENATAL RISK FACTORS  4/22 Problems (from 04/05/22 to present)     Problem Noted Resolved    Elevated blood pressure reading without diagnosis of hypertension 9/6/2022 by Geovani  DO Edith No    COVID-19 virus detected 2022 by Edith Olivo DO No    Overview Addendum 2022 10:08 AM by Edith Olivo DO     Status post COVID in early 2nd trimester. Mild symptoms, no hospitalization.   Consider BPPs, serial growth US in pregnancy    Opted for weekly BPP for monitoring, discussed r/b.         History of herpes genitalis 2022 by Edith Olivo DO No    Overview Signed 2022  5:23 PM by Edith Olivo DO     Plan for suppression at 35-36 weeks         Chronic constipation 2022 by Edith Olivo DO No    Overview Signed 2022  5:25 PM by Edith Olivo DO     Chronic constipation, internal hemorrhoids prior to pregnancy  Continue stool softener prn, may need to add Miralax         Supervision of other normal pregnancy, antepartum 2022 by Edith Olivo DO No    Overview Addendum 2022  5:10 PM by Edith Olivo DO     H/o spontaneous sab x1, early  Rh+  RI  IOB Hgb 13.1, Plt 206  NIPS: Low risk male  HSV-needs suppression 35-36 weeks         Urinary tract infection in mother during pregnancy, antepartum 2022 by Edith Olivo DO 2022 by Edith Olivo DO    Overview Signed 2022  5:08 PM by Edith Olivo DO     Repeat urine culture negative               DATING SUMMARY  LMP (22) -- HEAVENLY 22  1 TRI US (22 at 8w3d) -- HEAVENLY 22    OB HISTORY  OB History    Para Term  AB Living   2 0 0 0 1 0   SAB IAB Ectopic Molar Multiple Live Births   1 0 0 0 0 0      # Outcome Date GA Lbr Lux/2nd Weight Sex Delivery Anes PTL Lv   2 Current            1 SAB 2021             GYN HISTORY  Denies h/o sexually transmitted infections/pelvic inflammatory disease  Denies h/o abnormal pap smears, last pap was 9/10/21 NIL, absent transformation zone, consider repeat PP  Denies h/o gynecologic surgeries, including biopsies of the cervix    PAST MEDICAL HISTORY  Past Medical History:   Diagnosis Date   • Herpes    • Urinary tract infection  in mother during pregnancy, antepartum 7/24/2022     PAST SURGICAL HISTORY  No past surgical history on file.  FAMILY HISTORY  Family History   Problem Relation Age of Onset   • Melanoma Mother    • Hypertension Father    • No Known Problems Sister    • No Known Problems Brother    • No Known Problems Maternal Grandmother    • Cancer Maternal Grandfather    • Thyroid disease Maternal Grandfather    • Hypertension Paternal Grandmother    • Diabetes Paternal Grandmother    • Depression Paternal Grandmother      SOCIAL HISTORY  Social History     Socioeconomic History   • Marital status: Single   Tobacco Use   • Smoking status: Never   • Smokeless tobacco: Never   Vaping Use   • Vaping Use: Never used   Substance and Sexual Activity   • Alcohol use: No   • Drug use: No   • Sexual activity: Yes     Partners: Male     Birth control/protection: None     Comment: last pap smear negative on 9/10/21     ALLERGIES  No Known Allergies  HOME MEDICATIONS  Prior to Admission medications    Medication Sig Start Date End Date Taking? Authorizing Provider   docusate sodium (Colace) 100 MG capsule Take 1 capsule by mouth 2 (Two) Times a Day. 3/8/22  Yes Zaida Farah APRN   hydrocortisone (ANUSOL-HC) 25 MG suppository Insert 1 suppository into the rectum 2 (Two) Times a Day. 3/29/22  Yes Zaida Farah APRN   Hydrocortisone, Perianal, (ANUSOL-HC) 2.5 % rectal cream Insert  into the rectum 2 (Two) Times a Day. 3/29/22  Yes Zaida Farah APRN   prenatal vitamin (prenatal, CLASSIC, vitamin) tablet Take  by mouth Daily.   Yes Chris Yang MD   triamcinolone (KENALOG) 0.1 % ointment  4/12/22  Yes Chrsi Yang MD     PHYSICAL EXAM  /82   Wt 98.3 kg (216 lb 12.8 oz)   LMP 02/22/2022   BMI 36.08 kg/m²   General: No acute distress. Well developed, well nourished. Pleasant.  Heart: Regular rate and rhythm  Lungs: No increased work of breathing  Abdomen: Soft, nontender to palpation, enlarged by gravid  uterus.    NST Review  NST on admission    SVE: 1.5-3    Prelim US: cephalic, right posterior placenta, ALBINO 8.23 cm,  bpm, BPP 8/    IMPRESSION  Sandra Botello is a 27 y.o.  at 38w0d presenting for routine PNC, scheduled for EIOL.    PLAN  1.  IUP at 39+0 on  scheduled for EIOL  - Admit: Labor and Delivery  - Attending: Dr. Olivo  - Condition: Stable  - Vitals: per protocol  - Activity: ad jenn  - Nursing: Continuous electronic fetal monitoring, as per protocol  - Diet: Clears  - IV fluids:  mL/hr  - Meds: prn tylenol, zofran, stadol  - Allergies: NKDA  - Labs: CBC, T&S, UDS  - GBS: neg.  Antibiotics:  Not indicated  - Sandra Botello and I have discussed pain goals for this hospitalization after reviewing her current clinical condition, medical history and prior pain experiences.  The goal is to keep her pain level manageable.  Will discuss during admission if patient does desire an epidural  - If still 1.3 will likely start with cytotec for IOL, possibly with CRB  - Anticipate         This document has been electronically signed by Edith Olivo DO on 2022 21:20 CST      Electronically signed by Edith Olivo DO at 22 3444         Lab Results (last 24 hours)     Procedure Component Value Units Date/Time    TISSUE EXAM, [facility]) [[id number]] Collected: 22 1059    Specimen: Tissue from Placenta Updated: 22 1315    Comprehensive Metabolic Panel [[id number]]  (Abnormal) Collected: 22 0754    Specimen: Blood Updated: 22 0841     Glucose 81 mg/dL      BUN 9 mg/dL      Creatinine 0.69 mg/dL      Sodium 139 mmol/L      Potassium 3.8 mmol/L      Chloride 104 mmol/L      CO2 23.0 mmol/L      Calcium 8.8 mg/dL      Total Protein 6.4 g/dL      Albumin 3.30 g/dL      ALT (SGPT) 13 U/L      AST (SGOT) 17 U/L      Alkaline Phosphatase 133 U/L      Total Bilirubin 0.2 mg/dL      Globulin 3.1 gm/dL      A/G Ratio 1.1 g/dL       BUN/Creatinine Ratio 13.0     Anion Gap 12.0 mmol/L      eGFR 123.7 mL/min/1.73      Comment: National Kidney Foundation and American Society of Nephrology (ASN) Task Force recommended calculation based on the Chronic Kidney Disease Epidemiology Collaboration (CKD-EPI) equation refit without adjustment for race.       Narrative:      GFR Normal >60  Chronic Kidney Disease <60  Kidney Failure <15      CBC & Differential [[id number]]  (Abnormal) Collected: 11/23/22 0754    Specimen: Blood Updated: 11/23/22 0810    Narrative:      The following orders were created for panel order CBC & Differential.  Procedure                               Abnormality         Status                     ---------                               -----------         ------                     CBC Auto Differential[[id number]]        Abnormal            Final result                 Please view results for these tests on the individual orders.    CBC Auto Differential [[id number]]  (Abnormal) Collected: 11/23/22 0754    Specimen: Blood Updated: 11/23/22 0810     WBC 13.07 10*3/mm3      RBC 4.25 10*6/mm3      Hemoglobin 12.9 g/dL      Hematocrit 37.5 %      MCV 88.2 fL      MCH 30.4 pg      MCHC 34.4 g/dL      RDW 13.4 %      RDW-SD 42.8 fl      MPV 11.8 fL      Platelets 124 10*3/mm3      Neutrophil % 84.3 %      Lymphocyte % 10.3 %      Monocyte % 4.8 %      Eosinophil % 0.1 %      Basophil % 0.2 %      Immature Grans % 0.3 %      Neutrophils, Absolute 11.02 10*3/mm3      Lymphocytes, Absolute 1.35 10*3/mm3      Monocytes, Absolute 0.63 10*3/mm3      Eosinophils, Absolute 0.01 10*3/mm3      Basophils, Absolute 0.02 10*3/mm3      Immature Grans, Absolute 0.04 10*3/mm3      nRBC 0.0 /100 WBC         Imaging Results (Last 24 Hours)     ** No results found for the last 24 hours. **        ECG/EMG Results (last 24 hours)     ** No results found for the last 24 hours. **           Operative/Procedure Notes (last 24 hours)      Edith Olivo DO at  22 1024           [facility]   Primary Low Transverse  Delivery Note    Patient Name: Sandra Botello  : 1995  MRN: [id number]    Date of Delivery: 2022     Diagnosis     Pre & Post-Delivery:  Intrauterine pregnancy at 39w1d  Labor status: Induction of labor     Encounter for elective induction of labor    Category II fetal heart rate tracing during labor and delivery    Status post primary low transverse  section             Problem List    Transfer to Postpartum     Review the Delivery Report for details.     Delivery     Delivery: , Low Transverse     YOB: 2020    Time of Birth:  Gestational Age 9:39 AM   39w1d     Anesthesia: Epidural     Delivering clinician:     Forceps?   No   Vacuum? No    Shoulder dystocia present: No        Delivery narrative:  See operative report      Infant     Findings: male  infant     Infant observations: Weight: 3230 g (7 lb 1.9 oz)   Length: 20  in  Observations/Comments:        Apgars: 7  @ 1 minute /    8  @ 5 minutes   Infant Name: Darci Field     Placenta & Cord         Placenta delivered  Manual removal  at   2022  9:40 AM     Cord: 3 vessels  present.   Nuchal Cord?  no   Cord blood obtained: Yes    Cord gases obtained:  Yes    Cord gas results: Venous:  No results found for: PHCVEN    Arterial:  No results found for: PHCART     Repair      Episiotomy: Not recorded     No    Lacerations: No   Estimated Blood Loss:       Quantitative Blood Loss:          Complications     Suspected placental abruption, Nonreassuring fetal status    Disposition     Mother to Remain in LD  in stable condition currently due to preeclampsia with severe features.  Baby to remains with mom  in stable condition currently.    Edith Olivo DO  22  10:24 CST        Electronically signed by Edith Olivo DO at 22 1026          Physician Progress Notes (last 24 hours)      Edith Olivo DO at 22 4435         To patient room for evaluation.    After discussion of r/b AROM clear fluid. Tolerated well. Ctx q1-2 mins, 140's, accels present, decels absent. Still on pitocin 8 miU.     CBC and CMP. Intermittent mild range and normotensive. Did have severe range BP. Will continue to monitor closely. If persistent will need mag sulfate.    Electronically signed by Edith Olivo DO at 11/23/22 0711       Medical Student Notes (last 24 hours)  Notes from 11/22/22 1420 through 11/23/22 1420   No notes of this type exist for this encounter.         Consult Notes (last 24 hours)  Notes from 11/22/22 1420 through 11/23/22 1420   No notes of this type exist for this encounter.

## 2022-11-24 LAB
HCT VFR BLD AUTO: 28.7 % (ref 34–46.6)
HGB BLD-MCNC: 9.6 G/DL (ref 12–15.9)

## 2022-11-24 PROCEDURE — 85014 HEMATOCRIT: CPT | Performed by: STUDENT IN AN ORGANIZED HEALTH CARE EDUCATION/TRAINING PROGRAM

## 2022-11-24 PROCEDURE — 99232 SBSQ HOSP IP/OBS MODERATE 35: CPT | Performed by: OBSTETRICS & GYNECOLOGY

## 2022-11-24 PROCEDURE — 85018 HEMOGLOBIN: CPT | Performed by: STUDENT IN AN ORGANIZED HEALTH CARE EDUCATION/TRAINING PROGRAM

## 2022-11-24 PROCEDURE — 25010000002 KETOROLAC TROMETHAMINE PER 15 MG: Performed by: STUDENT IN AN ORGANIZED HEALTH CARE EDUCATION/TRAINING PROGRAM

## 2022-11-24 RX ADMIN — Medication 1 TABLET: at 09:32

## 2022-11-24 RX ADMIN — ACETAMINOPHEN 1000 MG: 500 TABLET ORAL at 14:03

## 2022-11-24 RX ADMIN — ACETAMINOPHEN 1000 MG: 500 TABLET ORAL at 07:33

## 2022-11-24 RX ADMIN — KETOROLAC TROMETHAMINE 15 MG: 15 INJECTION, SOLUTION INTRAMUSCULAR; INTRAVENOUS at 06:15

## 2022-11-24 RX ADMIN — ACETAMINOPHEN 650 MG: 325 TABLET, FILM COATED ORAL at 18:00

## 2022-11-24 RX ADMIN — SIMETHICONE 80 MG: 80 TABLET, CHEWABLE ORAL at 07:33

## 2022-11-24 RX ADMIN — IBUPROFEN 600 MG: 600 TABLET, FILM COATED ORAL at 17:54

## 2022-11-24 RX ADMIN — ACETAMINOPHEN 1000 MG: 500 TABLET ORAL at 01:15

## 2022-11-24 RX ADMIN — SIMETHICONE 80 MG: 80 TABLET, CHEWABLE ORAL at 20:34

## 2022-11-24 RX ADMIN — SIMETHICONE 80 MG: 80 TABLET, CHEWABLE ORAL at 14:03

## 2022-11-24 RX ADMIN — SIMETHICONE 80 MG: 80 TABLET, CHEWABLE ORAL at 17:55

## 2022-11-24 RX ADMIN — KETOROLAC TROMETHAMINE 15 MG: 15 INJECTION, SOLUTION INTRAMUSCULAR; INTRAVENOUS at 01:15

## 2022-11-24 NOTE — PROGRESS NOTES
[facility]  Progress Note - Obstetrics    Name: Sandra Botello  MRN: [id number]  Location: M755  Date: 2022  CSN: [id number]    POD #1 s/p emergency PLTCS at 39w1d secondary to fetal bradycardia    Patient seen and examined.  No complaints.  Pain well controlled.  Tolerating diet.  No nausea or vomiting.  No headache, dizziness, chest pain, or shortness of breath.  Passing flatus, denies bowel movement.  Up and out of bed and ambulating.  Voiding without difficulty.  Lochia minimal.  Bottlefeeding.    PHYSICAL EXAM  /75 (BP Location: Right arm, Patient Position: Sitting)   Pulse 83   Temp 98.7 °F (37.1 °C) (Oral)   Resp 16   LMP 2022   SpO2 97%   Breastfeeding Unknown   General: No apparent distress.  Alert and cooperative.  Rosmery.  Heart: Regular rate and rhythm.  No murmurs, rubs, or gallops.  Lungs: Clear to auscultation bilaterally.  No wheezes, rales, or rhonchi.  Abdomen: Soft.  Nontender to palpation.  No rebound tenderness or guarding.  +BS.  Dressing: Clean, dry, and intact.  No erythema or warmth.   Extremities: +2/4 posterior tibial.  No pitting edema in lower extremities.  No calf tenderness.  Uterus: Uterine fundus firm, non-tender and below umbilicus.    Intake/Output Summary (Last 24 hours) at 2022 0700  Last data filed at 2022 0619  Gross per 24 hour   Intake 1655 ml   Output 3746 ml   Net -2091 ml     LABS  Hgb: 12.9 -> 9.6    IMPRESSION  Sandra Botello is a 27 y.o.  POD #1 s/p emergency PLTCS at 39w1d secondary to fetal bradycardia.  Doing well and recovering appropriately.    PLAN  1.  Following surgery  - Continue routine post op care  - Encourage OOB and ambulation  - Encourage incentive spirometry  - Diet: Pregnancy/breastfeeding  - DVT prophylaxis: SCDs  - Contraception: Undecided  - Bottlefeeding  - Discharge patient home tomorrow.  1 week, 6 week follow-up for postpartum.    2.  Preeclampsia w/ severe  features  - s/p mag x12h  - No anti-hypertensive therapy    This document has been electronically signed by Chen Monets MD on November 24, 2022 07:00 CST.

## 2022-11-24 NOTE — PLAN OF CARE
Problem: Adult Inpatient Plan of Care  Goal: Plan of Care Review  11/23/2022 1902 by Jodee Sanford RN  Outcome: Ongoing, Progressing  Flowsheets  Taken 11/23/2022 1902 by Jodee Sanford RN  Outcome Evaluation: vss, magnesium maintained, jimenez in place, scd's on. Will transfer out to mb in 12 hrs from initiation.  Taken 11/23/2022 0700 by Cassie Hope RN  Progress: improving  Taken 11/22/2022 1758 by Negrita Chery RN  Plan of Care Reviewed With:   patient   spouse  11/23/2022 1305 by Jodee Sanford RN  Outcome: Ongoing, Progressing  Goal: Patient-Specific Goal (Individualized)  11/23/2022 1902 by Jodee Sanford RN  Outcome: Ongoing, Progressing  11/23/2022 1305 by Jodee Sanford RN  Outcome: Ongoing, Progressing  Goal: Absence of Hospital-Acquired Illness or Injury  11/23/2022 1902 by Jodee Sanford RN  Outcome: Ongoing, Progressing  11/23/2022 1305 by Jodee Sanford RN  Outcome: Ongoing, Progressing  Intervention: Identify and Manage Fall Risk  Recent Flowsheet Documentation  Taken 11/23/2022 1500 by Jodee Sanford RN  Safety Promotion/Fall Prevention:   safety round/check completed   assistive device/personal items within reach   clutter free environment maintained  Taken 11/23/2022 0700 by Jodee Sanford RN  Safety Promotion/Fall Prevention:   safety round/check completed   assistive device/personal items within reach   clutter free environment maintained  Intervention: Prevent Skin Injury  Recent Flowsheet Documentation  Taken 11/23/2022 1500 by Jodee Sanford RN  Body Position: sitting up in bed  Taken 11/23/2022 1251 by Jodee Sanford RN  Body Position: sitting up in bed  Taken 11/23/2022 1050 by Jodee Sanford RN  Body Position: sitting up in bed  Taken 11/23/2022 0700 by Jodee Sanford RN  Body Position: side-lying  Intervention: Prevent and Manage VTE (Venous Thromboembolism) Risk  Recent Flowsheet  Documentation  Taken 11/23/2022 1640 by Jodee Sanford RN  Activity Management:   stepped/marched in place   standing at bedside  Taken 11/23/2022 1500 by Jodee Sanford RN  Activity Management: activity adjusted per tolerance  VTE Prevention/Management:   bilateral   sequential compression devices on  Taken 11/23/2022 1251 by Jodee Sanford RN  Activity Management: activity minimized  VTE Prevention/Management:   bilateral   sequential compression devices on  Taken 11/23/2022 1050 by Jodee Sanford RN  Activity Management: bedrest  VTE Prevention/Management:   bilateral   sequential compression devices on  Taken 11/23/2022 0705 by Jodee Sanford RN  VTE Prevention/Management:   bilateral   patient refused intervention  Taken 11/23/2022 0700 by Jodee Sanford RN  Activity Management: activity adjusted per tolerance  VTE Prevention/Management:   bilateral   sequential compression devices on  Intervention: Prevent Infection  Recent Flowsheet Documentation  Taken 11/23/2022 1500 by Jodee Sanford RN  Infection Prevention:   visitors restricted/screened   hand hygiene promoted  Taken 11/23/2022 1251 by Jodee Sanford RN  Infection Prevention:   visitors restricted/screened   hand hygiene promoted  Taken 11/23/2022 1050 by Jodee Sanford RN  Infection Prevention:   visitors restricted/screened   hand hygiene promoted  Goal: Optimal Comfort and Wellbeing  11/23/2022 1902 by Jodee Sanford RN  Outcome: Ongoing, Progressing  11/23/2022 1305 by Jodee Sanford RN  Outcome: Ongoing, Progressing  Intervention: Provide Person-Centered Care  Recent Flowsheet Documentation  Taken 11/23/2022 1500 by Jodee Sanford RN  Trust Relationship/Rapport:   care explained   questions encouraged   questions answered   choices provided  Taken 11/23/2022 1251 by Jodee Sanford RN  Trust Relationship/Rapport:   care explained   questions encouraged    questions answered   choices provided  Taken 2022 1050 by Jodee Sanford RN  Trust Relationship/Rapport:   care explained   questions encouraged   questions answered  Taken 2022 0705 by Jodee Sanford RN  Trust Relationship/Rapport:   care explained   questions encouraged   questions answered  Taken 2022 0700 by Jodee Sanford RN  Trust Relationship/Rapport:   care explained   questions encouraged   questions answered   choices provided  Goal: Readiness for Transition of Care  2022 1902 by Jodee Sanford RN  Outcome: Ongoing, Progressing  2022 1305 by Jodee Sanford RN  Outcome: Ongoing, Progressing     Problem:  Fall Injury Risk  Goal: Absence of Fall, Infant Drop and Related Injury  2022 1902 by Jodee Sanford RN  Outcome: Ongoing, Progressing  2022 1305 by Jodee Sanford RN  Outcome: Ongoing, Progressing  Intervention: Promote Injury-Free Environment  Recent Flowsheet Documentation  Taken 2022 1500 by Jodee Sanford RN  Safety Promotion/Fall Prevention:   safety round/check completed   assistive device/personal items within reach   clutter free environment maintained  Taken 2022 0700 by Jodee Sanford RN  Safety Promotion/Fall Prevention:   safety round/check completed   assistive device/personal items within reach   clutter free environment maintained     Problem: Bleeding ( Delivery)  Goal: Bleeding is Controlled  2022 1902 by Jodee Sanford RN  Outcome: Ongoing, Progressing  2022 1305 by Jodee Sanford RN  Outcome: Ongoing, Progressing     Problem: Change in Fetal Wellbeing ( Delivery)  Goal: Stable Fetal Wellbeing  2022 1902 by Jodee Sanford RN  Outcome: Ongoing, Progressing  2022 1305 by Jodee Sanford RN  Outcome: Ongoing, Progressing  Intervention: Promote and Monitor Fetal Wellbeing  Recent Flowsheet  Documentation  Taken 2022 1500 by Jodee Sanford RN  Body Position: sitting up in bed  Taken 2022 1251 by Jodee Sanford RN  Body Position: sitting up in bed  Taken 2022 1050 by Jodee Sanford RN  Body Position: sitting up in bed  Taken 2022 0700 by Jodee Sanford RN  Body Position: side-lying     Problem: Infection ( Delivery)  Goal: Absence of Infection Signs and Symptoms  2022 1902 by Jodee Sanford RN  Outcome: Ongoing, Progressing  2022 1305 by Jodee Sanford RN  Outcome: Ongoing, Progressing  Intervention: Minimize Infection Risk  Recent Flowsheet Documentation  Taken 2022 1500 by Jodee Sanford RN  Infection Prevention:   visitors restricted/screened   hand hygiene promoted  Taken 2022 1251 by Jodee Sanford RN  Infection Prevention:   visitors restricted/screened   hand hygiene promoted  Taken 2022 1050 by Jodee Sanford RN  Infection Prevention:   visitors restricted/screened   hand hygiene promoted     Problem: Respiratory Compromise ( Delivery)  Goal: Effective Oxygenation and Ventilation  2022 1902 by Jodee Sanford RN  Outcome: Ongoing, Progressing  2022 1305 by Jodee Sanford RN  Outcome: Ongoing, Progressing     Problem: Device-Related Complication Risk (Anesthesia/Analgesia, Neuraxial)  Goal: Safe Infusion Delivery Completion  2022 1902 by Jodee Sanford RN  Outcome: Ongoing, Progressing  2022 1305 by Jodee Sanford RN  Outcome: Ongoing, Progressing     Problem: Infection (Anesthesia/Analgesia, Neuraxial)  Goal: Absence of Infection Signs and Symptoms  2022 1902 by Jodee Sanford RN  Outcome: Ongoing, Progressing  2022 1305 by Jodee Sanford RN  Outcome: Ongoing, Progressing  Intervention: Prevent or Manage Infection  Recent Flowsheet Documentation  Taken 2022 1500 by Jodee Sanford  A, RN  Infection Prevention:   visitors restricted/screened   hand hygiene promoted  Taken 11/23/2022 1251 by Jodee Sanford RN  Infection Prevention:   visitors restricted/screened   hand hygiene promoted  Taken 11/23/2022 1050 by Jodee Sanford RN  Infection Prevention:   visitors restricted/screened   hand hygiene promoted     Problem: Nausea and Vomiting (Anesthesia/Analgesia, Neuraxial)  Goal: Nausea and Vomiting Relief  11/23/2022 1902 by Jodee Sanford RN  Outcome: Ongoing, Progressing  11/23/2022 1305 by Jodee Sanford RN  Outcome: Ongoing, Progressing     Problem: Pain (Anesthesia/Analgesia, Neuraxial)  Goal: Effective Pain Control  11/23/2022 1902 by Jodee Sanford RN  Outcome: Ongoing, Progressing  11/23/2022 1305 by Jodee Sanford RN  Outcome: Ongoing, Progressing  Intervention: Prevent or Manage Pain  Recent Flowsheet Documentation  Taken 11/23/2022 1500 by Jodee Sanford RN  Diversional Activities:   smartphone   television  Taken 11/23/2022 1251 by Jodee Sanford RN  Diversional Activities:   television   smartphone  Taken 11/23/2022 1050 by Jodee Sanford RN  Diversional Activities:   smartphone   television  Taken 11/23/2022 0705 by Jodee Sanford RN  Diversional Activities:   television   smartphone  Taken 11/23/2022 0700 by Jodee Sanford RN  Diversional Activities:   smartphone   television     Problem: Respiratory Compromise (Anesthesia/Analgesia, Neuraxial)  Goal: Effective Oxygenation and Ventilation  11/23/2022 1902 by Jodee Sanford RN  Outcome: Ongoing, Progressing  11/23/2022 1305 by Jodee Sanford RN  Outcome: Ongoing, Progressing     Problem: Sensorimotor Impairment (Anesthesia/Analgesia, Neuraxial)  Goal: Baseline Motor Function  11/23/2022 1902 by Jodee Sanford RN  Outcome: Ongoing, Progressing  11/23/2022 1305 by Jodee Sanford RN  Outcome: Ongoing, Progressing  Intervention:  Optimize Sensorimotor Function  Recent Flowsheet Documentation  Taken 2022 1500 by Jodee Sanford RN  Safety Promotion/Fall Prevention:   safety round/check completed   assistive device/personal items within reach   clutter free environment maintained  Taken 2022 0700 by Jodee Sanford RN  Safety Promotion/Fall Prevention:   safety round/check completed   assistive device/personal items within reach   clutter free environment maintained     Problem: Urinary Retention (Anesthesia/Analgesia, Neuraxial)  Goal: Effective Urinary Elimination  2022 1902 by Jodee Sanford RN  Outcome: Ongoing, Progressing  2022 1305 by Jodee Sanford RN  Outcome: Ongoing, Progressing     Problem: Adjustment to Role Transition (Postpartum  Delivery)  Goal: Successful Maternal Role Transition  Outcome: Ongoing, Progressing     Problem: Bleeding (Postpartum  Delivery)  Goal: Hemostasis  Outcome: Ongoing, Progressing     Problem: Infection (Postpartum  Delivery)  Goal: Absence of Infection Signs and Symptoms  Outcome: Ongoing, Progressing     Problem: Pain (Postpartum  Delivery)  Goal: Acceptable Pain Control  Outcome: Ongoing, Progressing     Problem: Postoperative Nausea and Vomiting (Postpartum  Delivery)  Goal: Nausea and Vomiting Relief  Outcome: Ongoing, Progressing     Problem: Postoperative Urinary Retention (Postpartum  Delivery)  Goal: Effective Urinary Elimination  Outcome: Ongoing, Progressing   Goal Outcome Evaluation:              Outcome Evaluation: vss, magnesium maintained, jimenez in place, scd's on. Will transfer out to mb in 12 hrs from initiation.  Problem: Change in Fetal Wellbeing (Labor)  Goal: Stable Fetal Wellbeing  Outcome: Unable to Meet, Plan Revised     Problem: Delayed Labor Progression (Labor)  Goal: Effective Progression to Delivery  Outcome: Unable to Meet, Plan Revised

## 2022-11-25 VITALS
DIASTOLIC BLOOD PRESSURE: 82 MMHG | HEART RATE: 92 BPM | OXYGEN SATURATION: 98 % | SYSTOLIC BLOOD PRESSURE: 128 MMHG | TEMPERATURE: 98.5 F | RESPIRATION RATE: 18 BRPM

## 2022-11-25 PROCEDURE — 99238 HOSP IP/OBS DSCHRG MGMT 30/<: CPT | Performed by: OBSTETRICS & GYNECOLOGY

## 2022-11-25 RX ORDER — IBUPROFEN 600 MG/1
600 TABLET ORAL EVERY 6 HOURS
Qty: 30 TABLET | Refills: 0 | Status: SHIPPED | OUTPATIENT
Start: 2022-11-25 | End: 2023-01-04

## 2022-11-25 RX ORDER — ACETAMINOPHEN 325 MG/1
650 TABLET ORAL EVERY 6 HOURS PRN
Qty: 30 TABLET | Refills: 0 | Status: SHIPPED | OUTPATIENT
Start: 2022-11-25 | End: 2023-01-04

## 2022-11-25 RX ORDER — OXYCODONE HYDROCHLORIDE 5 MG/1
5 TABLET ORAL EVERY 4 HOURS PRN
Qty: 10 TABLET | Refills: 0 | Status: SHIPPED | OUTPATIENT
Start: 2022-11-25 | End: 2022-11-30

## 2022-11-25 RX ADMIN — IBUPROFEN 600 MG: 600 TABLET, FILM COATED ORAL at 00:56

## 2022-11-25 RX ADMIN — ACETAMINOPHEN 650 MG: 325 TABLET, FILM COATED ORAL at 06:06

## 2022-11-25 RX ADMIN — POLYETHYLENE GLYCOL 3350 17 G: 17 POWDER, FOR SOLUTION ORAL at 08:11

## 2022-11-25 RX ADMIN — SIMETHICONE 80 MG: 80 TABLET, CHEWABLE ORAL at 08:11

## 2022-11-25 RX ADMIN — IBUPROFEN 600 MG: 600 TABLET, FILM COATED ORAL at 06:06

## 2022-11-25 RX ADMIN — Medication 1 TABLET: at 08:11

## 2022-11-25 NOTE — PROGRESS NOTES
Progress Note - Obstetrics    Name: Sandra Botello  MRN: [id number]  Location: M755/1  Date: 2022  CSN: [id number]    POD #2 s/p emergency PLTCS at 39w1d secondary to fetal bradycardia    Patient seen and examined.  No complaints.  Pain well controlled.  Tolerating diet.  No nausea or vomiting.  No headache, dizziness, chest pain, or shortness of breath.  Passing flatus, denies bowel movement.  Up and out of bed and ambulating.  Voiding without difficulty.  Lochia minimal.  Bottlefeeding.    PHYSICAL EXAM  /82 (BP Location: Right arm, Patient Position: Sitting)   Pulse 92   Temp 98.7 °F (37.1 °C) (Oral)   Resp 18   LMP 2022   SpO2 98%   Breastfeeding No   General: No apparent distress.  Alert and cooperative.  Rosmery.  Heart: Regular rate and rhythm.  No murmurs, rubs, or gallops.  Lungs: Clear to auscultation bilaterally.  No wheezes, rales, or rhonchi.  Abdomen: Soft.  Nontender to palpation.  No rebound tenderness or guarding.  +BS.  Dressing: Clean, dry, and intact.  No erythema or warmth.   Extremities: +2/4 posterior tibial.  No pitting edema in lower extremities.  No calf tenderness.  Uterus: Uterine fundus firm, non-tender and below umbilicus.    LABS  Hgb: 12.9 -> 9.6    IMPRESSION  Snadra Botello is a 27 y.o.  POD #2 s/p emergency PLTCS at 39w1d secondary to fetal bradycardia.  Doing well and recovering appropriately.    PLAN  1.  Following surgery  - Continue routine post op care  - Encourage OOB and ambulation  - Encourage incentive spirometry  - Diet: Pregnancy/breastfeeding  - DVT prophylaxis: SCDs  - Contraception: Undecided  - Bottlefeeding  - Discharge patient home today.  1 week, 6 week follow-up for postpartum.    2.  Preeclampsia w/ severe features  - s/p mag x12h  - No anti-hypertensive therapy    This document has been electronically signed by Chen Montes MD on 2022 09:55 CST.

## 2022-11-25 NOTE — DISCHARGE SUMMARY
[facility]  Discharge Summary  Patient Name: Sandra Botello  : 1995  MRN: [id number]  CSN: [id number]    Discharge Summary    Date of Admission: 2022   Date of Discharge: 2022    Principle Discharge Dx: Active Hospital Problems    Diagnosis  POA   • **Encounter for elective induction of labor [Z34.90]  Not Applicable   • Category II fetal heart rate tracing during labor and delivery [O76]  No   • Status post primary low transverse  section [Z98.891]  Not Applicable   • Elevated blood pressure reading without diagnosis of hypertension [R03.0]  Yes   • COVID-19 virus detected [U07.1]  Yes     Status post COVID in early 2nd trimester. Mild symptoms, no hospitalization.   Consider BPPs, serial growth US in pregnancy    Opted for weekly BPP for monitoring, discussed r/b.     • History of herpes genitalis [Z86.19]  Yes     Plan for suppression at 35-36 weeks     • Chronic constipation [K59.09]  Yes     Chronic constipation, internal hemorrhoids prior to pregnancy  Continue stool softener prn, may need to add Miralax     • Supervision of other normal pregnancy, antepartum [Z34.80]  Not Applicable     H/o spontaneous sab x1, early  Rh+  RI  IOB Hgb 13.1, Plt 206  NIPS: Low risk male  HSV-needs suppression 35-36 weeks        Procedures Performed: Procedure(s):   SECTION PRIMARY   Brief History: Patient is a 27 y.o. now  who presented to labor and delivery at 39w0d for elective induction of labor.   Hospital Course: Patient presented at 39w0d for elective IOL.  During her IOL, she developed preeclampsia w/ severe features and then developed fetal bradycardia, presumably from placental abruption, necessitating emergency delivery.  She had a PLTCS.  Her postoperative course was unremarkable.  She did receive 12h of magnesium.  She did not require PO anti-hypertensive therapy.  On POD #2 she expressed the desire for discharge.  She had passed gas and  was urinating normally.  She was eating a regular diet without difficulty.  She was ambulating well.  Her incision was clean, dry and intact.  Discharge instructions were given.  All questions were answered   Condition:  Discharge Activity:                                                      Discharge Diet: Stable  Activity Instructions     Bathing Restrictions      Type of Restriction: Bathing    Bathing Restrictions: Other    Explain Bathing Restrictions: No soaking in bathtub for 4 weeks/ Showers are fine.    Driving Restrictions      Type of Restriction: Driving    Driving Restrictions: No Driving (Time Limited)    Length: Other    Indicate Length of Restriction: No driving for 1 week or while on narcotic pain medications. You must be able to quickly press on the brake before driving. Riding is car is fine.    Lifting Restrictions      Type of Restriction: Lifting    Lifting Restrictions: Other    Explain Lifing Restrictions: No lifting more than infant and baby carrier together for 6 weeks.    Pelvic Rest      Nothing in the vagina for 6 weeks to include tampons, douching, or sexual intercourse.    Sexual Activity Restrictions      Type of Restriction: Sex    Explain Sexual Activity Restrictions: No sexual intercourse, tampon use, or douching for at least 6 weeks        Regular   Discharge Medications:    Your medication list      START taking these medications      Instructions Last Dose Given Next Dose Due   acetaminophen 325 MG tablet  Commonly known as: TYLENOL      Take 2 tablets by mouth Every 6 (Six) Hours As Needed for Mild Pain.       ibuprofen 600 MG tablet  Commonly known as: ADVIL,MOTRIN      Take 1 tablet by mouth Every 6 (Six) Hours.  **Take with food**       oxyCODONE 5 MG immediate release tablet  Commonly known as: ROXICODONE      Take 1 tablet by mouth Every 4 (Four) Hours As Needed for Severe Pain for up to 5 days.          CONTINUE taking these medications      Instructions Last Dose Given  Next Dose Due   docusate sodium 100 MG capsule  Commonly known as: Colace      Take 1 capsule by mouth 2 (Two) Times a Day.       Hydrocortisone (Perianal) 2.5 % rectal cream  Commonly known as: ANUSOL-HC      Insert  into the rectum 2 (Two) Times a Day.       hydrocortisone 25 MG suppository  Commonly known as: ANUSOL-HC      Insert 1 suppository into the rectum 2 (Two) Times a Day.       prenatal (CLASSIC) vitamin  tablet  Generic drug: prenatal vitamin      Take  by mouth Daily.       triamcinolone 0.1 % ointment  Commonly known as: KENALOG                 Where to Get Your Medications      These medications were sent to [facility]  [address] [place] [address]    Hours: Monday through Friday 7:00am to 5:00pm Phone: [phone] ·   acetaminophen 325 MG tablet  · ibuprofen 600 MG tablet  · oxyCODONE 5 MG immediate release tablet        Discharge Disposition: Home   Follow-up: No future appointments.  1 week PP visit  6 week PP visit     <30 minutes spent with the patient.    This document has been electronically signed by Chen Montes MD on November 25, 2022 09:53 CST.

## 2022-11-25 NOTE — DISCHARGE INSTR - APPOINTMENTS
An after hours message has been sent to your provider's office. They should call you to schedule your follow-up appointment. If you have not received a call in an appropriate amount of time, please call their office to schedule a 1 week follow-up with Dr. Olivo.

## 2022-11-25 NOTE — PLAN OF CARE
Problem: Adult Inpatient Plan of Care  Goal: Plan of Care Review  Outcome: Ongoing, Progressing  Flowsheets  Taken 11/25/2022 0642 by Laura Aquino RN  Outcome Evaluation: VSS, voiding well, tolerating po intake, incision site clear, pain well controlled with scheduled pain meds. pt anticipates d/c home today  Taken 11/24/2022 1712 by Marcos Schaeffer RN  Plan of Care Reviewed With:   patient   spouse  Taken 11/23/2022 0700 by Cassie Hope RN  Progress: improving   Goal Outcome Evaluation:              Outcome Evaluation: VSS, voiding well, tolerating po intake, incision site clear, pain well controlled with scheduled pain meds. pt anticipates d/c home today

## 2022-11-28 NOTE — PAYOR COMM NOTE
"Eun Elizaebth  [facility]  Case Management Extender  [phone] phone  [phone] fax      Auth# [id number]    Sandra Botello (27 y.o. Female)     Date of Birth   1995    Social Security Number       Address   [address] [address]    Home Phone   [phone]    MRN   [id number]       Alevism   [facility]    Marital Status   Single                            Admission Date   22    Admission Type   Elective    Admitting Provider   Edith Olivo DO    Attending Provider       Department, Room/Bed   [facility] MOTHER BABY, M755/1       Discharge Date   2022    Discharge Disposition   Home or Self Care    Discharge Destination                               Attending Provider: (none)   Allergies: No Known Allergies    Isolation: None   Infection: None   Code Status: Prior    Ht: 165.1 cm (65\")   Wt: 98.3 kg (216 lb 12.8 oz)    Admission Cmt: None   Principal Problem: Encounter for elective induction of labor [Z34.90]                 Active Insurance as of 2022     Primary Coverage     Payor Plan Insurance Group Employer/Plan Group    [employer]HEM MEDICAID ANTH MEDICAID [id number]     Payor Plan Address Payor Plan Phone Number Payor Plan Fax Number Effective Dates    [address] [address] [phone]  2021 - None Entered    [facility] [id number]       Subscriber Name Subscriber Birth Date Member ID       SANDRA BOTELLO 1995 [id number]                 Emergency Contacts      (Rel.) Home Phone Work Phone Mobile Phone    Lynn Botello (Mother) [phone] -- --               Discharge Summary      Chen Montes MD at 22 0953          [facility]  Discharge Summary  Patient Name: Sandra Botello  : 1995  MRN: [id number]  CSN: [id number]    Discharge Summary    Date of Admission: 2022   Date of Discharge: 2022  "   Principle Discharge Dx: Active Hospital Problems    Diagnosis  POA   • **Encounter for elective induction of labor [Z34.90]  Not Applicable   • Category II fetal heart rate tracing during labor and delivery [O76]  No   • Status post primary low transverse  section [Z98.891]  Not Applicable   • Elevated blood pressure reading without diagnosis of hypertension [R03.0]  Yes   • COVID-19 virus detected [U07.1]  Yes     Status post COVID in early 2nd trimester. Mild symptoms, no hospitalization.   Consider BPPs, serial growth US in pregnancy    Opted for weekly BPP for monitoring, discussed r/b.     • History of herpes genitalis [Z86.19]  Yes     Plan for suppression at 35-36 weeks     • Chronic constipation [K59.09]  Yes     Chronic constipation, internal hemorrhoids prior to pregnancy  Continue stool softener prn, may need to add Miralax     • Supervision of other normal pregnancy, antepartum [Z34.80]  Not Applicable     H/o spontaneous sab x1, early  Rh+  RI  IOB Hgb 13.1, Plt 206  NIPS: Low risk male  HSV-needs suppression 35-36 weeks        Procedures Performed: Procedure(s):   SECTION PRIMARY   Brief History: Patient is a 27 y.o. now  who presented to labor and delivery at 39w0d for elective induction of labor.   Hospital Course: Patient presented at 39w0d for elective IOL.  During her IOL, she developed preeclampsia w/ severe features and then developed fetal bradycardia, presumably from placental abruption, necessitating emergency delivery.  She had a PLTCS.  Her postoperative course was unremarkable.  She did receive 12h of magnesium.  She did not require PO anti-hypertensive therapy.  On POD #2 she expressed the desire for discharge.  She had passed gas and was urinating normally.  She was eating a regular diet without difficulty.  She was ambulating well.  Her incision was clean, dry and intact.  Discharge instructions were given.  All questions were answered   Condition:  Discharge  Activity:                                                      Discharge Diet: Stable  Activity Instructions     Bathing Restrictions      Type of Restriction: Bathing    Bathing Restrictions: Other    Explain Bathing Restrictions: No soaking in bathtub for 4 weeks/ Showers are fine.    Driving Restrictions      Type of Restriction: Driving    Driving Restrictions: No Driving (Time Limited)    Length: Other    Indicate Length of Restriction: No driving for 1 week or while on narcotic pain medications. You must be able to quickly press on the brake before driving. Riding is car is fine.    Lifting Restrictions      Type of Restriction: Lifting    Lifting Restrictions: Other    Explain Lifing Restrictions: No lifting more than infant and baby carrier together for 6 weeks.    Pelvic Rest      Nothing in the vagina for 6 weeks to include tampons, douching, or sexual intercourse.    Sexual Activity Restrictions      Type of Restriction: Sex    Explain Sexual Activity Restrictions: No sexual intercourse, tampon use, or douching for at least 6 weeks        Regular   Discharge Medications:    Your medication list      START taking these medications      Instructions Last Dose Given Next Dose Due   acetaminophen 325 MG tablet  Commonly known as: TYLENOL      Take 2 tablets by mouth Every 6 (Six) Hours As Needed for Mild Pain.       ibuprofen 600 MG tablet  Commonly known as: ADVIL,MOTRIN      Take 1 tablet by mouth Every 6 (Six) Hours.  **Take with food**       oxyCODONE 5 MG immediate release tablet  Commonly known as: ROXICODONE      Take 1 tablet by mouth Every 4 (Four) Hours As Needed for Severe Pain for up to 5 days.          CONTINUE taking these medications      Instructions Last Dose Given Next Dose Due   docusate sodium 100 MG capsule  Commonly known as: Colace      Take 1 capsule by mouth 2 (Two) Times a Day.       Hydrocortisone (Perianal) 2.5 % rectal cream  Commonly known as: ANUSOL-HC      Insert  into the  rectum 2 (Two) Times a Day.       hydrocortisone 25 MG suppository  Commonly known as: ANUSOL-HC      Insert 1 suppository into the rectum 2 (Two) Times a Day.       prenatal (CLASSIC) vitamin  tablet  Generic drug: prenatal vitamin      Take  by mouth Daily.       triamcinolone 0.1 % ointment  Commonly known as: KENALOG                 Where to Get Your Medications      These medications were sent to [facility]  [address] [place] [address]    Hours: Monday through Friday 7:00am to 5:00pm Phone: [phone] ·   acetaminophen 325 MG tablet  · ibuprofen 600 MG tablet  · oxyCODONE 5 MG immediate release tablet        Discharge Disposition: Home   Follow-up: No future appointments.  1 week PP visit  6 week PP visit     <30 minutes spent with the patient.    This document has been electronically signed by Vito Krause MD on November 25, 2022 09:53 CST.    Electronically signed by Vito Krause MD at 11/25/22 0954       Discharge Order (From admission, onward)     Start     Ordered    11/25/22 0943  Discharge patient  Once        Expected Discharge Date: 11/25/22    Discharge Disposition: Home or Self Care    Physician of Record for Attribution - Please select from Treatment Team: VITO KRAUSE [[id number]]    Review needed by CMO to determine Physician of Record: No       Question Answer Comment   Physician of Record for Attribution - Please select from Treatment Team VITO KRAUSE    Review needed by CMO to determine Physician of Record No        11/25/22 0924

## 2022-11-29 ENCOUNTER — POSTPARTUM VISIT (OUTPATIENT)
Dept: OBSTETRICS AND GYNECOLOGY | Facility: CLINIC | Age: 27
End: 2022-11-29

## 2022-11-29 VITALS
SYSTOLIC BLOOD PRESSURE: 126 MMHG | HEIGHT: 66 IN | DIASTOLIC BLOOD PRESSURE: 86 MMHG | BODY MASS INDEX: 31.98 KG/M2 | WEIGHT: 199 LBS

## 2022-11-29 PROBLEM — Z34.80 SUPERVISION OF OTHER NORMAL PREGNANCY, ANTEPARTUM: Status: RESOLVED | Noted: 2022-04-24 | Resolved: 2022-11-29

## 2022-11-29 PROBLEM — Z34.90 ENCOUNTER FOR ELECTIVE INDUCTION OF LABOR: Status: RESOLVED | Noted: 2022-11-22 | Resolved: 2022-11-29

## 2022-11-29 LAB — REF LAB TEST METHOD: NORMAL

## 2022-11-29 PROCEDURE — 99212 OFFICE O/P EST SF 10 MIN: CPT | Performed by: NURSE PRACTITIONER

## 2022-11-29 NOTE — PROGRESS NOTES
"Subjective   No chief complaint on file.    Sandra Botello is a 27 y.o. year old  presenting to be seen for her postpartum visit.  She had a Primary  (LTCS).   Prenatal course was been complicated by fetal bradycardia and pre-eclampsia with severe features (12 Magnesium), no hypertension medications.    Since delivery she has not been sexually active.  She does not have concerns about post-partum blues/depression.   She is bottle feeding.    The following portions of the patient's history were reviewed and updated as appropriate:current medications and allergies    Social History    Tobacco Use      Smoking status: Never      Smokeless tobacco: Never    Review of Systems   Constitutional: Negative for chills, diaphoresis, fatigue, fever and unexpected weight change.   Respiratory: Negative for apnea, chest tightness and shortness of breath.    Cardiovascular: Negative for chest pain and palpitations.   Gastrointestinal: Negative for abdominal distention, abdominal pain, constipation and diarrhea.   Genitourinary: Negative for decreased urine volume, difficulty urinating, dysuria, enuresis, flank pain, frequency, genital sores, hematuria, pelvic pain, urgency, vaginal bleeding, vaginal discharge and vaginal pain.   Skin: Negative for rash.   Neurological: Negative for headaches.   Psychiatric/Behavioral: Negative for sleep disturbance and suicidal ideas.         Objective   /86   Ht 166.4 cm (65.5\")   Wt 90.3 kg (199 lb)   LMP 2022   Breastfeeding No   BMI 32.61 kg/m²     General:  well developed; well nourished  no acute distress   Abdomen: soft, non-tender; no masses  no umbilical or inguinal hernias are present  no hepato-splenomegaly  incision is healing, clean, dry and intact. Exofin removed.    fundus firm and non-tender  Normal findings: soft, non-tender, bowel sounds normal, no masses palpable and symmetric   Pelvis: Not performed.            Diagnoses and all orders for " this visit:    Postpartum follow-up    She desires STEFANIE for pp contraception.  RTC for 6 week pp appointment or sooner if needed.      No orders of the defined types were placed in this encounter.        This note was electronically signed.    JOSE Montenegro  November 29, 2022

## 2022-11-30 ENCOUNTER — PATIENT OUTREACH (OUTPATIENT)
Dept: OBSTETRICS AND GYNECOLOGY | Facility: HOSPITAL | Age: 27
End: 2022-11-30

## 2022-11-30 NOTE — OUTREACH NOTE
Motherhood Connection  Postpartum Check-In    Questions/Answers    Flowsheet Row Responses   Visit Setting Telephone   Best Method for Contacting Cell   OB Discharge Note Reviewed  Reviewed   OB Discharge Navigator Reviewed  Reviewed   OB Discharge Medications Reviewed  Reviewed   [place] discharged home with mother? Yes   Current Pain Levels 0-10 0   At Rest Pain Levels 0-10 0   Pain level with activity 0-10 0   Acceptable Pain Level 0-10 6   How do you treat your pain? Support Bra, Position Changes, Pillow Support   Verbalized Emotional State Acceptance, Relief   Family/Support Network Family, Significant Other   Level of Involvement in Care Attentive   Do you feel comfortable in your relationship with your baby? Yes   Have members of your household adjusted to your baby? Yes   Is the baby's father supportive and/or involved with the baby? Yes   How does your partner feel about the baby? Happy, Involved   Do you feel safe at home, school and work? Yes   Are you in a relationship with someone who threatens you or hurts you? No   Do you have the resources to keep yourself and your baby healthy and safe? Yes   Lochia (per patient report) Brown-juventino Red   Amount Scant   Number of pads per day 3   Lochia Odor None   Is patient breastfeeding? No   How is breast suppression going? doing fine, no issues or pain   Postpartum Depression Screening Education Education Provided   Peripheral Blood Glucose Education Provided   Doctor Appointments: Education Provided   Breastfeeding Education Education Provided   Family Planning Education Education Provided   Postpartum Care Education Education Provided   S & S to report Education Provided   Followup Appointments Made N/A   Well Child Visit Appointments Made N/A   Did you complete the visit? Yes   Were there any specific concerns? No   Umbilical Cord No reported signs or symptoms   Was the baby circumcised? Yes   Circumcision care and signs/symptoms to report Reviewed   Feeding  Readiness Cues: Eager, Energy for feeding, Finger Sucking   Infant Feeding Method Formula  [Similac]   Formula PO (mL) 2 oz   Formula/Expressed Milk frequency of feedings: q 3-4 hours   Number of wet diapers x 24 hours 7   Last BM x 24 hours 3   Emesis (Unmeasured Occurence) denies   What safe sleep surface is available? Bassinet   Are there stuffed animals, toys, pillows, quilts, blankets, wedges, positioners, bumpers or other loose bedding in the infant's sleeping environment? No   Where does the baby usually sleep? Bassinet   Does the baby ever share a sleep surface with a sibling, adult or pet? No   Does the baby ever share a sleep surface in a bed, couch, recliner or other? No   What position do you place your baby to sleep for naps? Back   What position do you place your baby to sleep at night Back   Are you and/or other caregivers smoking inside or outside the baby's home? No   Is the infant dressed appropiately for the temperature of the home? Yes   Do you use a clean, dry pacifier that is not attached to a string or stuffed animal? Yes          Review of Systems- negative per mother's report.    [place]  Depression Score: 0 (2022 10:00 AM)      Ruby sounded happy and pleasant on our call.  She denied any questions, concerns or needs at this time.    Eun Sanches RN  Maternity Nurse Navigator    2022, 11:33 CST

## 2022-12-08 ENCOUNTER — PATIENT OUTREACH (OUTPATIENT)
Dept: CALL CENTER | Facility: HOSPITAL | Age: 27
End: 2022-12-08

## 2022-12-08 NOTE — OUTREACH NOTE
Motherhood Connection Survey    Flowsheet Row Responses   [facility]   [facility] successful? Yes   Call start time 1213   Call end time 1220   Baby sex Boy   [place] discharged home with mother? Yes   Baby sex Boy   Delivery type    Emotional state Acceptance   Family support Yes   Do you have all necessary resources to care for you and your baby?  Yes   Have members of your household adjusted to your baby? Yes   Did you have any problems with pre-eclampsia during this pregnancy? No   Did you have blood glucose issues during this pregnancy No   Lochia amount Light   Lochia per patient report Rubra   Did you have an episiotomy/tear/abdominal incision? Yes   Additional comments incision healing well   Feeding Method Bottle   Frequency q 3 hrs   Amount 2 oz   Breast Condition No   Nipple Condition No   Number of wet diapers x 24 hours 8   Last BM x 24 hours 1-2 q 1-2 days   Umbilical Cord No reported signs or symptoms   Umbilical cord comments cord off   Was the baby circumcised? Yes   Circumcision care and signs/symptoms to report Reviewed   Circumcision comments healed   Where does the baby usually sleep? Bassinet   Are there stuffed animals, toys, pillows, quilts, blankets, wedges, positioners, bumpers or other loose bedding in the infant's sleeping environment? No   Does the baby ever share a sleep surface in a bed, couch, recliner or other? No   What position do you lay your baby down to sleep? Back   Are you and/or other caregivers smoking inside or outside the baby's home? No   Mom appointment comments: had f/u appt   Baby appointment comments: had well check   Call completed? Yes   How satisfied were you with the Motherhood Connection Program? 5            TRACIE DÍAZ - Registered Nurse

## 2023-01-04 ENCOUNTER — POSTPARTUM VISIT (OUTPATIENT)
Dept: OBSTETRICS AND GYNECOLOGY | Facility: CLINIC | Age: 28
End: 2023-01-04
Payer: MEDICAID

## 2023-01-04 VITALS
DIASTOLIC BLOOD PRESSURE: 68 MMHG | BODY MASS INDEX: 31.98 KG/M2 | HEIGHT: 66 IN | WEIGHT: 199 LBS | SYSTOLIC BLOOD PRESSURE: 110 MMHG

## 2023-01-04 DIAGNOSIS — Z12.4 CERVICAL CANCER SCREENING: ICD-10-CM

## 2023-01-04 PROCEDURE — 99212 OFFICE O/P EST SF 10 MIN: CPT | Performed by: STUDENT IN AN ORGANIZED HEALTH CARE EDUCATION/TRAINING PROGRAM

## 2023-01-09 LAB — REF LAB TEST METHOD: NORMAL

## 2023-01-09 RX ORDER — LEVONORGESTREL AND ETHINYL ESTRADIOL 0.15-0.03
KIT ORAL
Qty: 84 TABLET | Refills: 4 | Status: SHIPPED | OUTPATIENT
Start: 2023-01-09

## 2023-01-23 NOTE — PROGRESS NOTES
"Postpartum visit      Sandra Botello is a 28 y.o.  s/p This patient has no babies on file.on 22 at 39 weeks 1 day secondary to preeclampsia with severe features and bradycardia, emergency CS who presents today for postpartum check.  The patient states she is doing well.  Patient denies postpartum depression.  Menstrual cycles had not resumed until small amount bleeding yesterday when wiping.  bottlefeeding.  Desires OCP for contraception.  She has not resumed sexual intercourse.  Denies bowel or bladder issues.    22 EIOL initially with emergency PLTCS for fetal bradycardia, had developed preeclampsia with severe features, was not DC on any anti-hypertensives; boy Crew, weight 3230 g, APGARs 7+8, ?placental abruption at delivery    PHYSICAL EXAM:    /68   Ht 166.4 cm (65.5\")   Wt 90.3 kg (199 lb)   Breastfeeding No   BMI 32.61 kg/m²   Breast Exam: no concerns  Abdomen:  benign, no masses, soft, non-tender.  Incision: Well-healed, clean, dry, intact.  Bimanual exam: External genitalia appear normal.  Vagina pink, moist, rugated.  Uterus small, involuted to normal size and non-tender.  No palpable masses in adnexa.   Extremities: No deep calf tenderness.  Postpartum Depression Screening Questionnaire: 0, no treatment indicated.    IMPRESSION/PLAN: Sandra Botello is a 28 y.o.  s/p PLTCS emergency for nonreassuring fetal status on 22, complicated by peripartum preeclampsia with severe features.  Doing well.    - Recovered nicely from her delivery  - Contraception: OCP  - RTC 1 year for well woman exam  - 9/10/21 pap NIL but absent transformation zone, recommend repeat PP (performed today)      This document has been electronically signed by Edith Olivo DO on 2023 19:06 CST       "

## (undated) DEVICE — SUT VIC 2/0 SH 27IN

## (undated) DEVICE — GARMENT,MEDLINE,DVT,INT,CALF,MED, GEN2: Brand: MEDLINE

## (undated) DEVICE — SHEET,DRAPE,53X77,STERILE: Brand: MEDLINE

## (undated) DEVICE — SUT MNCRYL 3/0 Y936H

## (undated) DEVICE — PK C/SECT 60

## (undated) DEVICE — SUT MNCRYL 0/0 CTX 36IN Y398H

## (undated) DEVICE — SUT VIC 0 CT1 36IN J946H

## (undated) DEVICE — SUT  GUT PLAIN 2/0 CT1 27IN 843H

## (undated) DEVICE — TBG PENCL TELESCP MEGADYNE SMOKE EVAC 10FT

## (undated) DEVICE — SYS CLS SKIN PREMIERPRO EXOFINFUSION 22CM

## (undated) DEVICE — STERILE POLYISOPRENE POWDER-FREE SURGICAL GLOVES WITH EMOLLIENT COATING: Brand: PROTEXIS

## (undated) DEVICE — GLV SURG SIGNATURE ESSENTIAL PF LTX SZ6.5